# Patient Record
Sex: MALE | Race: OTHER | NOT HISPANIC OR LATINO | URBAN - METROPOLITAN AREA
[De-identification: names, ages, dates, MRNs, and addresses within clinical notes are randomized per-mention and may not be internally consistent; named-entity substitution may affect disease eponyms.]

---

## 2018-04-02 ENCOUNTER — EMERGENCY (EMERGENCY)
Facility: HOSPITAL | Age: 68
LOS: 1 days | Discharge: HOME | End: 2018-04-02
Attending: EMERGENCY MEDICINE
Payer: COMMERCIAL

## 2018-04-02 VITALS
TEMPERATURE: 97 F | HEART RATE: 79 BPM | SYSTOLIC BLOOD PRESSURE: 136 MMHG | RESPIRATION RATE: 18 BRPM | OXYGEN SATURATION: 100 % | DIASTOLIC BLOOD PRESSURE: 76 MMHG

## 2018-04-02 VITALS
HEART RATE: 67 BPM | OXYGEN SATURATION: 100 % | RESPIRATION RATE: 18 BRPM | DIASTOLIC BLOOD PRESSURE: 66 MMHG | TEMPERATURE: 97 F | SYSTOLIC BLOOD PRESSURE: 122 MMHG

## 2018-04-02 DIAGNOSIS — Z95.1 PRESENCE OF AORTOCORONARY BYPASS GRAFT: Chronic | ICD-10-CM

## 2018-04-02 LAB
ALBUMIN SERPL ELPH-MCNC: 3.3 G/DL — LOW (ref 3.5–5)
ALP SERPL-CCNC: 64 U/L — SIGNIFICANT CHANGE UP (ref 40–120)
ALT FLD-CCNC: 109 U/L DA — HIGH (ref 10–60)
ANION GAP SERPL CALC-SCNC: 9 MMOL/L — SIGNIFICANT CHANGE UP (ref 5–17)
APPEARANCE UR: CLEAR — SIGNIFICANT CHANGE UP
AST SERPL-CCNC: 96 U/L — HIGH (ref 10–40)
BASOPHILS # BLD AUTO: 0.1 K/UL — SIGNIFICANT CHANGE UP (ref 0–0.2)
BASOPHILS NFR BLD AUTO: 1 % — SIGNIFICANT CHANGE UP (ref 0–2)
BILIRUB SERPL-MCNC: 0.5 MG/DL — SIGNIFICANT CHANGE UP (ref 0.2–1.2)
BILIRUB UR-MCNC: NEGATIVE — SIGNIFICANT CHANGE UP
BUN SERPL-MCNC: 13 MG/DL — SIGNIFICANT CHANGE UP (ref 7–18)
CALCIUM SERPL-MCNC: 8.2 MG/DL — LOW (ref 8.4–10.5)
CHLORIDE SERPL-SCNC: 104 MMOL/L — SIGNIFICANT CHANGE UP (ref 96–108)
CO2 SERPL-SCNC: 22 MMOL/L — SIGNIFICANT CHANGE UP (ref 22–31)
COLOR SPEC: YELLOW — SIGNIFICANT CHANGE UP
CREAT SERPL-MCNC: 0.91 MG/DL — SIGNIFICANT CHANGE UP (ref 0.5–1.3)
DIFF PNL FLD: NEGATIVE — SIGNIFICANT CHANGE UP
EOSINOPHIL # BLD AUTO: 0.5 K/UL — SIGNIFICANT CHANGE UP (ref 0–0.5)
EOSINOPHIL NFR BLD AUTO: 6 % — SIGNIFICANT CHANGE UP (ref 0–6)
GLUCOSE SERPL-MCNC: 143 MG/DL — HIGH (ref 70–99)
GLUCOSE UR QL: NEGATIVE — SIGNIFICANT CHANGE UP
HCT VFR BLD CALC: 41.6 % — SIGNIFICANT CHANGE UP (ref 39–50)
HGB BLD-MCNC: 13.4 G/DL — SIGNIFICANT CHANGE UP (ref 13–17)
KETONES UR-MCNC: NEGATIVE — SIGNIFICANT CHANGE UP
LEUKOCYTE ESTERASE UR-ACNC: ABNORMAL
LYMPHOCYTES # BLD AUTO: 1.9 K/UL — SIGNIFICANT CHANGE UP (ref 1–3.3)
LYMPHOCYTES # BLD AUTO: 20.9 % — SIGNIFICANT CHANGE UP (ref 13–44)
MCHC RBC-ENTMCNC: 25.8 PG — LOW (ref 27–34)
MCHC RBC-ENTMCNC: 32.2 GM/DL — SIGNIFICANT CHANGE UP (ref 32–36)
MCV RBC AUTO: 80 FL — SIGNIFICANT CHANGE UP (ref 80–100)
MONOCYTES # BLD AUTO: 1.1 K/UL — HIGH (ref 0–0.9)
MONOCYTES NFR BLD AUTO: 12.4 % — SIGNIFICANT CHANGE UP (ref 2–14)
NEUTROPHILS # BLD AUTO: 5.3 K/UL — SIGNIFICANT CHANGE UP (ref 1.8–7.4)
NEUTROPHILS NFR BLD AUTO: 59.7 % — SIGNIFICANT CHANGE UP (ref 43–77)
NITRITE UR-MCNC: NEGATIVE — SIGNIFICANT CHANGE UP
PH UR: 6 — SIGNIFICANT CHANGE UP (ref 5–8)
PLATELET # BLD AUTO: 313 K/UL — SIGNIFICANT CHANGE UP (ref 150–400)
POTASSIUM SERPL-MCNC: 2.8 MMOL/L — CRITICAL LOW (ref 3.5–5.3)
POTASSIUM SERPL-SCNC: 2.8 MMOL/L — CRITICAL LOW (ref 3.5–5.3)
PROT SERPL-MCNC: 7.1 G/DL — SIGNIFICANT CHANGE UP (ref 6–8.3)
PROT UR-MCNC: 15
RBC # BLD: 5.21 M/UL — SIGNIFICANT CHANGE UP (ref 4.2–5.8)
RBC # FLD: 10.8 % — SIGNIFICANT CHANGE UP (ref 10.3–14.5)
SODIUM SERPL-SCNC: 135 MMOL/L — SIGNIFICANT CHANGE UP (ref 135–145)
SP GR SPEC: 1.01 — SIGNIFICANT CHANGE UP (ref 1.01–1.02)
UROBILINOGEN FLD QL: NEGATIVE — SIGNIFICANT CHANGE UP
WBC # BLD: 8.9 K/UL — SIGNIFICANT CHANGE UP (ref 3.8–10.5)
WBC # FLD AUTO: 8.9 K/UL — SIGNIFICANT CHANGE UP (ref 3.8–10.5)

## 2018-04-02 PROCEDURE — 99284 EMERGENCY DEPT VISIT MOD MDM: CPT

## 2018-04-02 PROCEDURE — 81001 URINALYSIS AUTO W/SCOPE: CPT

## 2018-04-02 PROCEDURE — 99284 EMERGENCY DEPT VISIT MOD MDM: CPT | Mod: 25

## 2018-04-02 PROCEDURE — 87045 FECES CULTURE AEROBIC BACT: CPT

## 2018-04-02 PROCEDURE — 93005 ELECTROCARDIOGRAM TRACING: CPT

## 2018-04-02 PROCEDURE — 96376 TX/PRO/DX INJ SAME DRUG ADON: CPT

## 2018-04-02 PROCEDURE — 85027 COMPLETE CBC AUTOMATED: CPT

## 2018-04-02 PROCEDURE — 87177 OVA AND PARASITES SMEARS: CPT

## 2018-04-02 PROCEDURE — 80053 COMPREHEN METABOLIC PANEL: CPT

## 2018-04-02 PROCEDURE — 96374 THER/PROPH/DIAG INJ IV PUSH: CPT

## 2018-04-02 PROCEDURE — 87046 STOOL CULTR AEROBIC BACT EA: CPT

## 2018-04-02 RX ORDER — POTASSIUM CHLORIDE 20 MEQ
10 PACKET (EA) ORAL
Qty: 0 | Refills: 0 | Status: COMPLETED | OUTPATIENT
Start: 2018-04-02 | End: 2018-04-02

## 2018-04-02 RX ORDER — MOXIFLOXACIN HYDROCHLORIDE TABLETS, 400 MG 400 MG/1
1 TABLET, FILM COATED ORAL
Qty: 10 | Refills: 0 | OUTPATIENT
Start: 2018-04-02 | End: 2018-04-06

## 2018-04-02 RX ORDER — SODIUM CHLORIDE 9 MG/ML
1000 INJECTION INTRAMUSCULAR; INTRAVENOUS; SUBCUTANEOUS ONCE
Qty: 0 | Refills: 0 | Status: COMPLETED | OUTPATIENT
Start: 2018-04-02 | End: 2018-04-02

## 2018-04-02 RX ORDER — CIPROFLOXACIN LACTATE 400MG/40ML
500 VIAL (ML) INTRAVENOUS ONCE
Qty: 0 | Refills: 0 | Status: COMPLETED | OUTPATIENT
Start: 2018-04-02 | End: 2018-04-02

## 2018-04-02 RX ORDER — POTASSIUM CHLORIDE 20 MEQ
40 PACKET (EA) ORAL ONCE
Qty: 0 | Refills: 0 | Status: COMPLETED | OUTPATIENT
Start: 2018-04-02 | End: 2018-04-02

## 2018-04-02 RX ADMIN — Medication 100 MILLIEQUIVALENT(S): at 17:18

## 2018-04-02 RX ADMIN — Medication 40 MILLIEQUIVALENT(S): at 17:17

## 2018-04-02 RX ADMIN — Medication 100 MILLIEQUIVALENT(S): at 19:56

## 2018-04-02 RX ADMIN — Medication 100 MILLIEQUIVALENT(S): at 18:39

## 2018-04-02 RX ADMIN — SODIUM CHLORIDE 1000 MILLILITER(S): 9 INJECTION INTRAMUSCULAR; INTRAVENOUS; SUBCUTANEOUS at 16:40

## 2018-04-02 RX ADMIN — Medication 500 MILLIGRAM(S): at 17:18

## 2018-04-02 NOTE — ED PROVIDER NOTE - PROGRESS NOTE DETAILS
k+ = 2.8, 40meq po and 10meq iv x 3. abd remains soft, non-tender. EKG reviewed. pt moved to main for electrolyte replacement. explained to patient. will endorse to Dr. Garcia. Pt feels better, asking to go home. Tolerated PO, no BM in ED. Educated on care and f/u PMD. Answered q's.

## 2018-04-02 NOTE — ED PROVIDER NOTE - ATTENDING CONTRIBUTION TO CARE
h/o CAD  1 week nonbloody diarrhea, subjective fever  onset while visiting [place]  no abd pain or vomiting  oral mucosa dry, abd soft NT  labs-hypokalemia  IV potassium, supplement with oral  pt feels better agrees to outpatient management  tolerated po  cipro bid x 5 days, f/u PMD

## 2018-04-02 NOTE — ED PROVIDER NOTE - SKIN, MLM
Skin is dry, warm, dry and intact. No evidence of rash. Skin is PALE, dry, warm, dry and intact. No evidence of rash.

## 2018-04-02 NOTE — ED PROVIDER NOTE - OBJECTIVE STATEMENT
68 y/o M pt w/ PMHx of HTN, DM cardiac stents presents c/o diarrhea x 5 days. Pt just returned from [place] and [place]. Pt is on plavix, aspirin, Metroprolol, lisinopril, Atorvastatin clopidogrel, isosorbide mononitrate. Denies any other complaints. NKDA. 66 y/o M pt w/ PMHx of HTN, DM cardiac stents presents c/o diarrhea x 5 days. no nausea/vomiting/abd pain. denies fevers. Pt just returned from [place] and [place]. Pt is on plavix, aspirin, Metroprolol, lisinopril, Atorvastatin clopidogrel, isosorbide mononitrate. Denies any other complaints. NKDA.

## 2018-04-02 NOTE — ED PROVIDER NOTE - MEDICAL DECISION MAKING DETAILS
Pt w/ diarrhea. Will do labs, hydrate, stool cultures, consider CT. Reassess. Pt w/ diarrhea. Will do labs, hydrate, stool cultures. Reassess.

## 2018-04-04 LAB
CULTURE RESULTS: SIGNIFICANT CHANGE UP
SPECIMEN SOURCE: SIGNIFICANT CHANGE UP

## 2018-04-05 LAB
CULTURE RESULTS: SIGNIFICANT CHANGE UP
SPECIMEN SOURCE: SIGNIFICANT CHANGE UP

## 2018-07-05 ENCOUNTER — EMERGENCY (EMERGENCY)
Facility: HOSPITAL | Age: 68
LOS: 1 days | Discharge: HOME | End: 2018-07-05
Attending: EMERGENCY MEDICINE
Payer: COMMERCIAL

## 2018-07-05 VITALS — WEIGHT: 167.99 LBS | HEIGHT: 68 IN

## 2018-07-05 VITALS
DIASTOLIC BLOOD PRESSURE: 68 MMHG | OXYGEN SATURATION: 98 % | RESPIRATION RATE: 17 BRPM | SYSTOLIC BLOOD PRESSURE: 121 MMHG | TEMPERATURE: 98 F | HEART RATE: 67 BPM

## 2018-07-05 DIAGNOSIS — Z95.1 PRESENCE OF AORTOCORONARY BYPASS GRAFT: Chronic | ICD-10-CM

## 2018-07-05 PROBLEM — I10 ESSENTIAL (PRIMARY) HYPERTENSION: Chronic | Status: ACTIVE | Noted: 2018-04-02

## 2018-07-05 PROBLEM — I25.10 ATHEROSCLEROTIC HEART DISEASE OF NATIVE CORONARY ARTERY WITHOUT ANGINA PECTORIS: Chronic | Status: ACTIVE | Noted: 2018-04-02

## 2018-07-05 PROCEDURE — 99282 EMERGENCY DEPT VISIT SF MDM: CPT

## 2018-07-05 PROCEDURE — 73090 X-RAY EXAM OF FOREARM: CPT | Mod: 26,LT

## 2018-07-05 PROCEDURE — 73090 X-RAY EXAM OF FOREARM: CPT

## 2018-07-05 PROCEDURE — 73080 X-RAY EXAM OF ELBOW: CPT | Mod: 26,LT

## 2018-07-05 PROCEDURE — 93971 EXTREMITY STUDY: CPT | Mod: 26,LT

## 2018-07-05 PROCEDURE — 93971 EXTREMITY STUDY: CPT

## 2018-07-05 PROCEDURE — 99284 EMERGENCY DEPT VISIT MOD MDM: CPT | Mod: 25

## 2018-07-05 PROCEDURE — 73080 X-RAY EXAM OF ELBOW: CPT

## 2018-07-05 RX ORDER — IBUPROFEN 200 MG
600 TABLET ORAL ONCE
Qty: 0 | Refills: 0 | Status: COMPLETED | OUTPATIENT
Start: 2018-07-05 | End: 2018-07-05

## 2018-07-05 RX ORDER — DICLOFENAC SODIUM 30 MG/G
2 GEL TOPICAL
Qty: 60 | Refills: 0 | OUTPATIENT
Start: 2018-07-05 | End: 2018-07-11

## 2018-07-05 RX ORDER — METHOCARBAMOL 500 MG/1
750 TABLET, FILM COATED ORAL ONCE
Qty: 0 | Refills: 0 | Status: DISCONTINUED | OUTPATIENT
Start: 2018-07-05 | End: 2018-07-05

## 2018-07-05 RX ADMIN — Medication 600 MILLIGRAM(S): at 17:04

## 2018-07-05 RX ADMIN — Medication 600 MILLIGRAM(S): at 13:50

## 2018-07-05 NOTE — ED PROVIDER NOTE - ATTENDING CONTRIBUTION TO CARE
left forearm swelling after forced hyperextension. on exam mild localized swelling to proximal forearm. 2+ radial andbrachial pulse. good cap refill. duplex xray normal. home with ace wrap.

## 2018-07-05 NOTE — ED PROVIDER NOTE - PROGRESS NOTE DETAILS
Xrays and US negative. History and findings suggestive of muscle strain vs possible tear. Will dc with ortho follow up. Will give Voltaren gel Rx and ACE wrap. Pt is well appearing walking with steady gait, stable for discharge and follow up without fail with medical doctor. I had a detailed discussion with the patient and/or guardian regarding the historical points, exam findings, and any diagnostic results supporting the discharge diagnosis. Pt educated on care and need for follow up. Strict return instructions and red flag signs and symptoms discussed with patient. Questions answered. Pt shows understanding of discharge information and agrees to follow.
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
## 2018-07-05 NOTE — ED PROVIDER NOTE - PHYSICAL EXAMINATION
L arm exam: L elbow full range of motion without bony tenderness. Proximal forearm swelling without erythema, streaking, induration, drainage of increased heat. Mild localized tenderness. Radial/ulnar pulses intact 2+.

## 2019-10-30 ENCOUNTER — APPOINTMENT (OUTPATIENT)
Dept: CARDIOLOGY | Facility: CLINIC | Age: 69
End: 2019-10-30

## 2019-10-31 ENCOUNTER — APPOINTMENT (OUTPATIENT)
Dept: CARDIOLOGY | Facility: CLINIC | Age: 69
End: 2019-10-31
Payer: MEDICARE

## 2019-10-31 ENCOUNTER — NON-APPOINTMENT (OUTPATIENT)
Age: 69
End: 2019-10-31

## 2019-10-31 VITALS
BODY MASS INDEX: 24.25 KG/M2 | OXYGEN SATURATION: 98 % | SYSTOLIC BLOOD PRESSURE: 147 MMHG | HEIGHT: 68 IN | DIASTOLIC BLOOD PRESSURE: 75 MMHG | HEART RATE: 61 BPM | WEIGHT: 160 LBS

## 2019-10-31 PROCEDURE — 93000 ELECTROCARDIOGRAM COMPLETE: CPT

## 2019-10-31 PROCEDURE — 99204 OFFICE O/P NEW MOD 45 MIN: CPT

## 2019-10-31 NOTE — DISCUSSION/SUMMARY
[FreeTextEntry1] : Patient with a complex cardiac history with CABG followed by multiple stents.  No follow up in several years.  Nuclear stress ordered to evaluate extent of ischemic disease.

## 2019-10-31 NOTE — HISTORY OF PRESENT ILLNESS
[FreeTextEntry1] : Patient s/p CABG in 2004.  After CABG he needed emergent coronary stent after an MI.  He is currently free of chest pain or exertional dyspnea.  He remains very active.

## 2019-10-31 NOTE — PHYSICAL EXAM
[General Appearance - Well Developed] : well developed [Normal Appearance] : normal appearance [Well Groomed] : well groomed [General Appearance - Well Nourished] : well nourished [No Deformities] : no deformities [General Appearance - In No Acute Distress] : no acute distress [Normal Conjunctiva] : the conjunctiva exhibited no abnormalities [Eyelids - No Xanthelasma] : the eyelids demonstrated no xanthelasmas [Normal Oral Mucosa] : normal oral mucosa [No Oral Pallor] : no oral pallor [No Oral Cyanosis] : no oral cyanosis [Normal Jugular Venous A Waves Present] : normal jugular venous A waves present [Normal Jugular Venous V Waves Present] : normal jugular venous V waves present [No Jugular Venous Triana A Waves] : no jugular venous triana A waves [Heart Rate And Rhythm] : heart rate and rhythm were normal [Heart Sounds] : normal S1 and S2 [Murmurs] : no murmurs present [Respiration, Rhythm And Depth] : normal respiratory rhythm and effort [Exaggerated Use Of Accessory Muscles For Inspiration] : no accessory muscle use [Auscultation Breath Sounds / Voice Sounds] : lungs were clear to auscultation bilaterally [Abdomen Soft] : soft [Abdomen Tenderness] : non-tender [Abdomen Mass (___ Cm)] : no abdominal mass palpated [Abnormal Walk] : normal gait [Gait - Sufficient For Exercise Testing] : the gait was sufficient for exercise testing [Nail Clubbing] : no clubbing of the fingernails [Cyanosis, Localized] : no localized cyanosis [Petechial Hemorrhages (___cm)] : no petechial hemorrhages [Skin Color & Pigmentation] : normal skin color and pigmentation [] : no rash [No Venous Stasis] : no venous stasis [Skin Lesions] : no skin lesions [No Skin Ulcers] : no skin ulcer [No Xanthoma] : no  xanthoma was observed [Oriented To Time, Place, And Person] : oriented to person, place, and time [Affect] : the affect was normal [Mood] : the mood was normal [No Anxiety] : not feeling anxious

## 2019-10-31 NOTE — REASON FOR VISIT
[Consultation] : a consultation regarding [Coronary Artery Disease] : coronary artery disease [Hyperlipidemia] : hyperlipidemia

## 2019-11-07 ENCOUNTER — APPOINTMENT (OUTPATIENT)
Dept: CV DIAGNOSTICS | Facility: HOSPITAL | Age: 69
End: 2019-11-07

## 2019-11-07 ENCOUNTER — OUTPATIENT (OUTPATIENT)
Dept: OUTPATIENT SERVICES | Facility: HOSPITAL | Age: 69
LOS: 1 days | End: 2019-11-07
Payer: COMMERCIAL

## 2019-11-07 DIAGNOSIS — Z95.1 PRESENCE OF AORTOCORONARY BYPASS GRAFT: Chronic | ICD-10-CM

## 2019-11-07 DIAGNOSIS — I25.10 ATHEROSCLEROTIC HEART DISEASE OF NATIVE CORONARY ARTERY WITHOUT ANGINA PECTORIS: ICD-10-CM

## 2019-11-07 PROCEDURE — 93018 CV STRESS TEST I&R ONLY: CPT

## 2019-11-07 PROCEDURE — A9500: CPT

## 2019-11-07 PROCEDURE — 78452 HT MUSCLE IMAGE SPECT MULT: CPT

## 2019-11-07 PROCEDURE — 78452 HT MUSCLE IMAGE SPECT MULT: CPT | Mod: 26

## 2019-11-07 PROCEDURE — 93017 CV STRESS TEST TRACING ONLY: CPT

## 2019-11-07 PROCEDURE — 93016 CV STRESS TEST SUPVJ ONLY: CPT

## 2019-11-14 LAB
CHOLEST SERPL-MCNC: 121 MG/DL
CHOLEST/HDLC SERPL: 2.8 RATIO
HDLC SERPL-MCNC: 43 MG/DL
LDLC SERPL CALC-MCNC: 57 MG/DL
TRIGL SERPL-MCNC: 107 MG/DL

## 2019-11-18 RX ORDER — ASPIRIN ENTERIC COATED TABLETS 81 MG 81 MG/1
81 TABLET, DELAYED RELEASE ORAL DAILY
Refills: 3 | Status: ACTIVE | COMMUNITY

## 2019-11-18 RX ORDER — MULTIVIT-MIN/FA/LYCOPEN/LUTEIN .4-300-25
TABLET ORAL
Refills: 0 | Status: ACTIVE | COMMUNITY

## 2019-12-06 ENCOUNTER — MEDICATION RENEWAL (OUTPATIENT)
Age: 69
End: 2019-12-06

## 2020-01-13 ENCOUNTER — TRANSCRIPTION ENCOUNTER (OUTPATIENT)
Age: 70
End: 2020-01-13

## 2020-02-27 ENCOUNTER — NON-APPOINTMENT (OUTPATIENT)
Age: 70
End: 2020-02-27

## 2020-02-27 ENCOUNTER — APPOINTMENT (OUTPATIENT)
Dept: CARDIOLOGY | Facility: CLINIC | Age: 70
End: 2020-02-27
Payer: MEDICARE

## 2020-02-27 VITALS
DIASTOLIC BLOOD PRESSURE: 77 MMHG | WEIGHT: 168 LBS | SYSTOLIC BLOOD PRESSURE: 137 MMHG | BODY MASS INDEX: 25.54 KG/M2 | HEART RATE: 64 BPM | OXYGEN SATURATION: 98 %

## 2020-02-27 PROCEDURE — 93000 ELECTROCARDIOGRAM COMPLETE: CPT

## 2020-02-27 PROCEDURE — 99214 OFFICE O/P EST MOD 30 MIN: CPT

## 2020-02-27 NOTE — HISTORY OF PRESENT ILLNESS
[FreeTextEntry1] : Patient with CAD s/p CABG in 2004.  He is active and without any symptoms of ischemia.  Nuclear showed kenna of infarct with post infarct ischemia.  LDL 57

## 2020-02-27 NOTE — PHYSICAL EXAM
[General Appearance - Well Developed] : well developed [Normal Appearance] : normal appearance [Well Groomed] : well groomed [No Deformities] : no deformities [General Appearance - Well Nourished] : well nourished [Normal Conjunctiva] : the conjunctiva exhibited no abnormalities [General Appearance - In No Acute Distress] : no acute distress [Normal Oral Mucosa] : normal oral mucosa [Eyelids - No Xanthelasma] : the eyelids demonstrated no xanthelasmas [No Oral Pallor] : no oral pallor [No Oral Cyanosis] : no oral cyanosis [Normal Jugular Venous A Waves Present] : normal jugular venous A waves present [No Jugular Venous Triana A Waves] : no jugular venous triana A waves [Normal Jugular Venous V Waves Present] : normal jugular venous V waves present [Exaggerated Use Of Accessory Muscles For Inspiration] : no accessory muscle use [Respiration, Rhythm And Depth] : normal respiratory rhythm and effort [Auscultation Breath Sounds / Voice Sounds] : lungs were clear to auscultation bilaterally [Heart Rate And Rhythm] : heart rate and rhythm were normal [Murmurs] : no murmurs present [Heart Sounds] : normal S1 and S2 [Abdomen Soft] : soft [Abdomen Tenderness] : non-tender [Abdomen Mass (___ Cm)] : no abdominal mass palpated [Abnormal Walk] : normal gait [Gait - Sufficient For Exercise Testing] : the gait was sufficient for exercise testing [Nail Clubbing] : no clubbing of the fingernails [Cyanosis, Localized] : no localized cyanosis [Petechial Hemorrhages (___cm)] : no petechial hemorrhages [] : no rash [Skin Color & Pigmentation] : normal skin color and pigmentation [No Venous Stasis] : no venous stasis [Skin Lesions] : no skin lesions [No Skin Ulcers] : no skin ulcer [Oriented To Time, Place, And Person] : oriented to person, place, and time [No Xanthoma] : no  xanthoma was observed [Mood] : the mood was normal [No Anxiety] : not feeling anxious [Affect] : the affect was normal

## 2020-08-27 ENCOUNTER — APPOINTMENT (OUTPATIENT)
Dept: CARDIOLOGY | Facility: CLINIC | Age: 70
End: 2020-08-27

## 2020-11-23 ENCOUNTER — APPOINTMENT (OUTPATIENT)
Dept: CARDIOLOGY | Facility: CLINIC | Age: 70
End: 2020-11-23

## 2021-02-11 ENCOUNTER — RX RENEWAL (OUTPATIENT)
Age: 71
End: 2021-02-11

## 2021-03-25 ENCOUNTER — APPOINTMENT (OUTPATIENT)
Dept: CARDIOLOGY | Facility: CLINIC | Age: 71
End: 2021-03-25

## 2021-04-16 ENCOUNTER — RX RENEWAL (OUTPATIENT)
Age: 71
End: 2021-04-16

## 2022-01-21 ENCOUNTER — EMERGENCY (EMERGENCY)
Facility: HOSPITAL | Age: 72
LOS: 1 days | Discharge: HOME | End: 2022-01-21
Attending: EMERGENCY MEDICINE | Admitting: EMERGENCY MEDICINE
Payer: MEDICARE

## 2022-01-21 VITALS
SYSTOLIC BLOOD PRESSURE: 152 MMHG | OXYGEN SATURATION: 100 % | TEMPERATURE: 97 F | HEART RATE: 60 BPM | RESPIRATION RATE: 16 BRPM | DIASTOLIC BLOOD PRESSURE: 74 MMHG | HEIGHT: 68 IN

## 2022-01-21 VITALS
OXYGEN SATURATION: 100 % | DIASTOLIC BLOOD PRESSURE: 75 MMHG | RESPIRATION RATE: 16 BRPM | SYSTOLIC BLOOD PRESSURE: 146 MMHG | HEART RATE: 60 BPM | TEMPERATURE: 98 F

## 2022-01-21 DIAGNOSIS — Z95.1 PRESENCE OF AORTOCORONARY BYPASS GRAFT: Chronic | ICD-10-CM

## 2022-01-21 LAB
ALBUMIN SERPL ELPH-MCNC: 4.7 G/DL — SIGNIFICANT CHANGE UP (ref 3.3–5)
ALP SERPL-CCNC: 58 U/L — SIGNIFICANT CHANGE UP (ref 40–120)
ALT FLD-CCNC: 33 U/L — SIGNIFICANT CHANGE UP (ref 4–41)
ANION GAP SERPL CALC-SCNC: 10 MMOL/L — SIGNIFICANT CHANGE UP (ref 7–14)
AST SERPL-CCNC: 24 U/L — SIGNIFICANT CHANGE UP (ref 4–40)
BASOPHILS # BLD AUTO: 0.05 K/UL — SIGNIFICANT CHANGE UP (ref 0–0.2)
BASOPHILS NFR BLD AUTO: 0.5 % — SIGNIFICANT CHANGE UP (ref 0–2)
BILIRUB SERPL-MCNC: 0.4 MG/DL — SIGNIFICANT CHANGE UP (ref 0.2–1.2)
BUN SERPL-MCNC: 15 MG/DL — SIGNIFICANT CHANGE UP (ref 7–23)
CALCIUM SERPL-MCNC: 9.6 MG/DL — SIGNIFICANT CHANGE UP (ref 8.4–10.5)
CHLORIDE SERPL-SCNC: 103 MMOL/L — SIGNIFICANT CHANGE UP (ref 98–107)
CO2 SERPL-SCNC: 25 MMOL/L — SIGNIFICANT CHANGE UP (ref 22–31)
CREAT SERPL-MCNC: 0.78 MG/DL — SIGNIFICANT CHANGE UP (ref 0.5–1.3)
EOSINOPHIL # BLD AUTO: 1.18 K/UL — HIGH (ref 0–0.5)
EOSINOPHIL NFR BLD AUTO: 12.8 % — HIGH (ref 0–6)
GLUCOSE SERPL-MCNC: 142 MG/DL — HIGH (ref 70–99)
HCT VFR BLD CALC: 42.8 % — SIGNIFICANT CHANGE UP (ref 39–50)
HGB BLD-MCNC: 13.9 G/DL — SIGNIFICANT CHANGE UP (ref 13–17)
IANC: 5.66 K/UL — SIGNIFICANT CHANGE UP (ref 1.5–8.5)
IMM GRANULOCYTES NFR BLD AUTO: 0.3 % — SIGNIFICANT CHANGE UP (ref 0–1.5)
LYMPHOCYTES # BLD AUTO: 1.55 K/UL — SIGNIFICANT CHANGE UP (ref 1–3.3)
LYMPHOCYTES # BLD AUTO: 16.8 % — SIGNIFICANT CHANGE UP (ref 13–44)
MCHC RBC-ENTMCNC: 25.9 PG — LOW (ref 27–34)
MCHC RBC-ENTMCNC: 32.5 GM/DL — SIGNIFICANT CHANGE UP (ref 32–36)
MCV RBC AUTO: 79.9 FL — LOW (ref 80–100)
MONOCYTES # BLD AUTO: 0.76 K/UL — SIGNIFICANT CHANGE UP (ref 0–0.9)
MONOCYTES NFR BLD AUTO: 8.2 % — SIGNIFICANT CHANGE UP (ref 2–14)
NEUTROPHILS # BLD AUTO: 5.66 K/UL — SIGNIFICANT CHANGE UP (ref 1.8–7.4)
NEUTROPHILS NFR BLD AUTO: 61.4 % — SIGNIFICANT CHANGE UP (ref 43–77)
NRBC # BLD: 0 /100 WBCS — SIGNIFICANT CHANGE UP
NRBC # FLD: 0 K/UL — SIGNIFICANT CHANGE UP
NT-PROBNP SERPL-SCNC: 75 PG/ML — SIGNIFICANT CHANGE UP
PLATELET # BLD AUTO: 257 K/UL — SIGNIFICANT CHANGE UP (ref 150–400)
POTASSIUM SERPL-MCNC: 4.6 MMOL/L — SIGNIFICANT CHANGE UP (ref 3.5–5.3)
POTASSIUM SERPL-SCNC: 4.6 MMOL/L — SIGNIFICANT CHANGE UP (ref 3.5–5.3)
PROT SERPL-MCNC: 8 G/DL — SIGNIFICANT CHANGE UP (ref 6–8.3)
RBC # BLD: 5.36 M/UL — SIGNIFICANT CHANGE UP (ref 4.2–5.8)
RBC # FLD: 13.6 % — SIGNIFICANT CHANGE UP (ref 10.3–14.5)
SODIUM SERPL-SCNC: 138 MMOL/L — SIGNIFICANT CHANGE UP (ref 135–145)
WBC # BLD: 9.23 K/UL — SIGNIFICANT CHANGE UP (ref 3.8–10.5)
WBC # FLD AUTO: 9.23 K/UL — SIGNIFICANT CHANGE UP (ref 3.8–10.5)

## 2022-01-21 PROCEDURE — 99284 EMERGENCY DEPT VISIT MOD MDM: CPT

## 2022-01-21 RX ORDER — ACETAMINOPHEN 500 MG
650 TABLET ORAL ONCE
Refills: 0 | Status: COMPLETED | OUTPATIENT
Start: 2022-01-21 | End: 2022-01-21

## 2022-01-21 RX ORDER — KETOROLAC TROMETHAMINE 30 MG/ML
15 SYRINGE (ML) INJECTION ONCE
Refills: 0 | Status: DISCONTINUED | OUTPATIENT
Start: 2022-01-21 | End: 2022-01-21

## 2022-01-21 RX ORDER — LIDOCAINE 4 G/100G
1 CREAM TOPICAL ONCE
Refills: 0 | Status: COMPLETED | OUTPATIENT
Start: 2022-01-21 | End: 2022-01-21

## 2022-01-21 RX ADMIN — LIDOCAINE 1 PATCH: 4 CREAM TOPICAL at 11:41

## 2022-01-21 RX ADMIN — Medication 650 MILLIGRAM(S): at 13:50

## 2022-01-21 RX ADMIN — Medication 650 MILLIGRAM(S): at 11:40

## 2022-01-21 RX ADMIN — Medication 15 MILLIGRAM(S): at 13:50

## 2022-01-21 RX ADMIN — Medication 15 MILLIGRAM(S): at 11:40

## 2022-01-21 NOTE — ED ADULT NURSE REASSESSMENT NOTE - NS ED NURSE REASSESS COMMENT FT1
pt remains alert,oriented x3. states decreased pain to back. lunch given at present. awaits continued eval,labs. will continue to monitor

## 2022-01-21 NOTE — ED PROVIDER NOTE - PATIENT PORTAL LINK FT
You can access the FollowMyHealth Patient Portal offered by [facility] by registering at the following website: [email or web address]. By joining [employer]’s FollowMyHealth portal, you will also be able to view your health information using other applications (apps) compatible with our system.

## 2022-01-21 NOTE — ED PROVIDER NOTE - NSFOLLOWUPINSTRUCTIONS_ED_ALL_ED_FT
[facility]  Orthopedic Surgery  [address] [place] [address]  Phone: [phone]  Fax:   Follow Up Time:     [facility]  Orthopedic Surgery  [address], 3rd & 4th [place] [address]  Phone: [phone]  Fax:   Follow Up Time:     [facility] [place]  Orthopedics  .  [place]   Phone: [phone]  Fax:   Follow Up Time:     [facility]  Orthopedics  [address] [place] [address]  Phone: [phone]  Fax: [phone]  Follow Up Time:     [facility]  Orthopedics  [address] [place] [address]  Phone: [phone]  Fax:   Follow Up Time:     Back Pain  WHAT YOU NEED TO KNOW:  Back pain is common. It can be caused by many conditions, such as arthritis or the breakdown of spinal discs. Your risk for back pain is increased by injuries, lack of activity, or repeated bending and twisting. You may feel sore or stiff on one or both sides of your back. The pain may spread to your buttocks or thighs.  DISCHARGE INSTRUCTIONS:  Return to the emergency department if:   •You have pain, numbness, or weakness in one or both legs.  •Your pain becomes so severe that you cannot walk.  •You cannot control your urine or bowel movements.  •You have severe back pain with chest pain.  •You have severe back pain, nausea, and vomiting.  •You have severe back pain that spreads to your side or genital area.  Contact your healthcare provider if:   •You have back pain that does not get better with rest and pain medicine.  •You have a fever.  •You have pain that worsens when you are on your back or when you rest.  •You have pain that worsens when you cough or sneeze.  •You lose weight without trying.  •You have questions or concerns about your condition or care.  Medicines:   •NSAIDs help decrease swelling and pain. This medicine is available with or without a doctor's order. NSAIDs can cause stomach bleeding or kidney problems in certain people. If you take blood thinner medicine, always ask your healthcare provider if NSAIDs are safe for you. Always read the medicine label and follow directions.  •Acetaminophen decreases pain and fever. It is available without a doctor's order. Ask how much to take and how often to take it. Follow directions. Read the labels of all other medicines you are using to see if they also contain acetaminophen, or ask your doctor or pharmacist. Acetaminophen can cause liver damage if not taken correctly. Do not use more than 4 grams (4,000 milligrams) total of acetaminophen in one day.   •Muscle relaxers help decrease muscle spasms and back pain.  •Prescription pain medicine may be given. Ask your healthcare provider how to take this medicine safely. Some prescription pain medicines contain acetaminophen. Do not take other medicines that contain acetaminophen without talking to your healthcare provider. Too much acetaminophen may cause liver damage. Prescription pain medicine may cause constipation. Ask your healthcare provider how to prevent or treat constipation.   •Take your medicine as directed. Contact your healthcare provider if you think your medicine is not helping or if you have side effects. Tell him or her if you are allergic to any medicine. Keep a list of the medicines, vitamins, and herbs you take. Include the amounts, and when and why you take them. Bring the list or the pill bottles to follow-up visits. Carry your medicine list with you in case of an emergency.  How to manage your back pain:   •Apply ice on your back for 15 to 20 minutes every hour or as directed. Use an ice pack, or put crushed ice in a plastic bag. Cover it with a towel before you apply it to your skin. Ice helps prevent tissue damage and decreases pain.  •Apply heat on your back for 20 to 30 minutes every 2 hours for as many days as directed. Heat helps decrease pain and muscle spasms.  •Stay active as much as you can without causing more pain. Bed rest could make your back pain worse. Avoid heavy lifting until your pain is gone.  •Go to physical therapy as directed. A physical therapist can teach you exercises to help improve movement and strength, and to decrease pain.  Follow up with your healthcare provider in 2 weeks, or as directed: Write down your questions so you remember to ask them during your visits.

## 2022-01-21 NOTE — ED ADULT NURSE REASSESSMENT NOTE - NS ED NURSE REASSESS COMMENT FT1
pt remains alert,oriented x3. denies c/o pain iv removed by pa,instructions given by pa. pt ambulates to d/c area with rn assistance for son

## 2022-01-21 NOTE — ED PROVIDER NOTE - OBJECTIVE STATEMENT
71y male with PMHx of MI, CAD (3 stents), DM, HTN presents with lower back pain, bilateral knee pain, and bilateral edema of the legs/feet for the last two weeks. Patient states the lower back pain is a 7/10 pain that occurs with sitting/standing up. The back pain radiates out to both sides. He notes bilateral edema in his feet, but this morning the swelling resolved. Patient states his bilateral knee pain is chronic and he previously has received steroid shots with relief. He has taken Tylenol with minimal relief of symptoms. Patient went to Urgent care this morning for the back pain. They sent patient to ER because his EKG showed inferior infarct, which looks similar to past EKGs. Patient denies SOB, chest pain, previous edema, calf pain, fever, N/V trauma, numbness/ tingling, weakness in legs, urinary retention/incontinence, saddle anesthesia.

## 2022-01-21 NOTE — ED PROVIDER NOTE - ATTENDING CONTRIBUTION TO CARE
Seen and examined, states 2wks of gradually worsening LBP bilat, denies inciting event or fall, typically walks daily but has dec. walking recently with inc. pain. Also noted inc. swelling to ankles/feet in past week which was improving for past 2d, today states legs seem normal and not swollen. Denies CP/SOB/orthopnea, no palpitations/lightheadedness, no change in urination. Pt. used Tylenol 650mg x 3doses over past 10d, and has some relief. No meds PTA. Went to urgentcare and sent to ED for eval. due to leg edema. MMM, clear lungs, heart reg, abd soft, NT to palp, no CVAT, scant edema, pitting evident with strong and sustained pressure to ant. ankle, equal pulses, equal girth, NT calves.

## 2022-01-21 NOTE — ED PROVIDER NOTE - CLINICAL SUMMARY MEDICAL DECISION MAKING FREE TEXT BOX
71y male with PMHx of MI, CAD (3 stents), DM, HTN presents with lower back pain and bilateral edema of the legs/ feet for the last two weeks. Patient went to Urgent care where his EKG showed possible inferior infarct and sent him to the ER. EKG findings are most likely old changes. Patient's back pain is likely musculoskeletal. Will order follow up outpatient Ortho, CBC, CMP, Pro BNP, pain relief.

## 2022-01-21 NOTE — ED ADULT NURSE NOTE - OBJECTIVE STATEMENT
pt alert,oriented x3 reports pain to knees ,back since receiving covid  booster. [facility]. . md at bedside. placed c monitot . denies ch pains,diff breathing. states ankles swollen earlier. no swelling noted  at present

## 2022-01-21 NOTE — ED ADULT TRIAGE NOTE - CHIEF COMPLAINT QUOTE
c/o back/knee pain and BL feet swelling for 3x weeks. Denies chest pain, SOB. Sent from urgent care for further workup. Hx CABG on aspirin, HTN, CAD

## 2022-01-21 NOTE — ED PROVIDER NOTE - CARE PLAN
Assessment and plan of treatment:	71y male with PMHx of MI, CAD (3 stents), DM, HTN presents with lower back pain, bilateral knee pain, and bilateral edema of the legs/foot for the last two weeks.   1 Principal Discharge DX:	Back pain  Assessment and plan of treatment:	71y male with PMHx of MI, CAD (3 stents), DM, HTN presents with lower back pain, bilateral knee pain, and bilateral edema of the legs/foot for the last two weeks.

## 2022-01-21 NOTE — ED PROVIDER NOTE - PLAN OF CARE
71y male with PMHx of MI, CAD (3 stents), DM, HTN presents with lower back pain, bilateral knee pain, and bilateral edema of the legs/foot for the last two weeks.

## 2022-01-21 NOTE — ED PROVIDER NOTE - SKIN, MLM
Skin normal color for race, warm, dry and intact. No evidence of rash. +1 pitting edema of the bilateral legs
day(s)
Patient/Caregiver provided printed discharge information.

## 2022-01-26 ENCOUNTER — APPOINTMENT (OUTPATIENT)
Dept: ORTHOPEDIC SURGERY | Facility: CLINIC | Age: 72
End: 2022-01-26
Payer: MEDICARE

## 2022-01-26 ENCOUNTER — NON-APPOINTMENT (OUTPATIENT)
Age: 72
End: 2022-01-26

## 2022-01-26 VITALS
WEIGHT: 167 LBS | OXYGEN SATURATION: 98 % | HEART RATE: 61 BPM | SYSTOLIC BLOOD PRESSURE: 136 MMHG | DIASTOLIC BLOOD PRESSURE: 67 MMHG | HEIGHT: 68 IN | BODY MASS INDEX: 25.31 KG/M2

## 2022-01-26 DIAGNOSIS — M54.16 RADICULOPATHY, LUMBAR REGION: ICD-10-CM

## 2022-01-26 PROCEDURE — 72100 X-RAY EXAM L-S SPINE 2/3 VWS: CPT

## 2022-01-26 PROCEDURE — 99204 OFFICE O/P NEW MOD 45 MIN: CPT

## 2022-03-20 ENCOUNTER — FORM ENCOUNTER (OUTPATIENT)
Age: 72
End: 2022-03-20

## 2023-01-27 NOTE — ED PROVIDER NOTE - CONDUCTED A DETAILED DISCUSSION WITH PATIENT AND/OR GUARDIAN REGARDING, MDM
How Severe Is Your Hair Loss?: mild
need for outpatient follow-up/lab results/return to ED if symptoms worsen, persist or questions arise

## 2023-02-06 NOTE — PHYSICAL EXAM
[General Appearance - Well Developed] : well developed [Normal Appearance] : normal appearance [Well Groomed] : well groomed [General Appearance - Well Nourished] : well nourished [No Deformities] : no deformities [General Appearance - In No Acute Distress] : no acute distress [Normal Conjunctiva] : the conjunctiva exhibited no abnormalities [Eyelids - No Xanthelasma] : the eyelids demonstrated no xanthelasmas [Normal Oral Mucosa] : normal oral mucosa [No Oral Pallor] : no oral pallor [No Oral Cyanosis] : no oral cyanosis [Normal Jugular Venous A Waves Present] : normal jugular venous A waves present [Normal Jugular Venous V Waves Present] : normal jugular venous V waves present [No Jugular Venous Triana A Waves] : no jugular venous triana A waves [Respiration, Rhythm And Depth] : normal respiratory rhythm and effort [Exaggerated Use Of Accessory Muscles For Inspiration] : no accessory muscle use [Auscultation Breath Sounds / Voice Sounds] : lungs were clear to auscultation bilaterally [Heart Rate And Rhythm] : heart rate and rhythm were normal [Heart Sounds] : normal S1 and S2 [Murmurs] : no murmurs present [Abdomen Soft] : soft [Abdomen Tenderness] : non-tender [Abdomen Mass (___ Cm)] : no abdominal mass palpated [Abnormal Walk] : normal gait [Gait - Sufficient For Exercise Testing] : the gait was sufficient for exercise testing [Nail Clubbing] : no clubbing of the fingernails [Cyanosis, Localized] : no localized cyanosis [Petechial Hemorrhages (___cm)] : no petechial hemorrhages [Skin Color & Pigmentation] : normal skin color and pigmentation [] : no rash [No Venous Stasis] : no venous stasis [Skin Lesions] : no skin lesions [No Skin Ulcers] : no skin ulcer [No Xanthoma] : no  xanthoma was observed [Oriented To Time, Place, And Person] : oriented to person, place, and time [Affect] : the affect was normal [Mood] : the mood was normal [No Anxiety] : not feeling anxious

## 2023-02-07 ENCOUNTER — NON-APPOINTMENT (OUTPATIENT)
Age: 73
End: 2023-02-07

## 2023-02-07 ENCOUNTER — APPOINTMENT (OUTPATIENT)
Dept: CARDIOLOGY | Facility: CLINIC | Age: 73
End: 2023-02-07
Payer: MEDICARE

## 2023-02-07 VITALS
DIASTOLIC BLOOD PRESSURE: 74 MMHG | SYSTOLIC BLOOD PRESSURE: 140 MMHG | BODY MASS INDEX: 25.76 KG/M2 | RESPIRATION RATE: 17 BRPM | HEART RATE: 58 BPM | OXYGEN SATURATION: 99 % | WEIGHT: 170 LBS | HEIGHT: 68 IN

## 2023-02-07 VITALS — DIASTOLIC BLOOD PRESSURE: 80 MMHG | SYSTOLIC BLOOD PRESSURE: 130 MMHG

## 2023-02-07 DIAGNOSIS — Z86.39 PERSONAL HISTORY OF OTHER ENDOCRINE, NUTRITIONAL AND METABOLIC DISEASE: ICD-10-CM

## 2023-02-07 PROCEDURE — 93000 ELECTROCARDIOGRAM COMPLETE: CPT

## 2023-02-07 PROCEDURE — 99215 OFFICE O/P EST HI 40 MIN: CPT

## 2023-02-07 PROCEDURE — 36415 COLL VENOUS BLD VENIPUNCTURE: CPT

## 2023-02-09 PROBLEM — Z86.39 HISTORY OF TYPE 2 DIABETES MELLITUS: Status: RESOLVED | Noted: 2023-02-07 | Resolved: 2023-02-09

## 2023-02-09 NOTE — DISCUSSION/SUMMARY
[EKG obtained to assist in diagnosis and management of assessed problem(s)] : EKG obtained to assist in diagnosis and management of assessed problem(s) [FreeTextEntry1] : I asked the patient to return in the next 2-3 months for follow-up stress isotope study, given his history of diabetes and his complex cardiac history including prior infarct, prior CABG, and prior stent placement.\par \par We will request copies of records from [facility] in [facility] where his stents were performed and from [facility], where he believes his coronary artery bypass was performed. \par \par We will send out blood work today.\par \par At his next visit, we will also arrange for an echocardiogram to further assess myocardial function, as well as valvular function.\par \par He will continue his present regimen of medications.\par \par 70 minutes spent on today's office visit. \par \par He will return again in 2 to 3 months for follow-up visit with echo & a nuclear stress test.

## 2023-02-09 NOTE — REASON FOR VISIT
[FreeTextEntry1] : \real Rafa Farfan presents for cardiac evaluation regarding a hx. of CAD s/p CABG in 2004 and subsequently a myocardial infarction requiring urgent stent placement in 2013.  History also includes hypertension, hyperlipidemia and diabetes.

## 2023-02-09 NOTE — HISTORY OF PRESENT ILLNESS
[FreeTextEntry1] : Mr. Farfan has a hx. of CAD and first came to the attention of physicians in 2004.  Cholesterol levels were found to be elevated at that time.  Apparently further work-up was done, the details of which he does not recall.  As best he remembers, the work-up was done through [facility] and apparently this led to CABG in 2004; the details of the operation itself are not available at this time.\par \par In 2013, he was hospitalized at [facility].  Apparently he presented with unstable angina/myocardial infarction.  Stents were placed at that time; he has stent cards that indicate that Resolute stents were placed in the left main and in the proximal LAD.  \par \par History includes hypertension and hyperlipidemia, and over the past 5 years diabetes.\par \par In our EMR, a stress isotope study was done in November 2019, as requested by Dr. Whitt.  He exercised for 8 minutes and 19 seconds to a peak heart rate of 131 bpm.  Non-diagnostic horizontal ST depressions were seen; perfusion scans demonstrated a left ventricle that was normal in size.  Large, moderate to severe defects were described in the inferior, inferolateral, apical/lateral and basal inferolateral septal walls, that were partially reversible, interpreted as consistent with infarct with moderate isiah-infarct ischemia.  Also, a small, mild defect was seen in the mid to distal anteroseptal wall that was mostly affected consistent with infarct and minimal isiah-infarct ischemia.  Overall ejection fraction was 54%, with hypokinesis affecting the inferior wall segments and the apex.\par \par He tells me that 5 years ago he was diagnosed with diabetes for which he takes metformin.\par \par For the most part, he has been well since his stent procedure in 2013.  A few weeks ago, he recounts an episode of chest discomfort, lingering over 2 to 3 days continuously.  Symptoms were relieved by ibuprofen.  Physical activity such as walking had no impact on his symptoms.  In retrospect, the likely this was a nonischemic pain.\par \par At present, he is back to his normal activities without any symptoms at all.  Currently he has no exertional chest discomfort or dyspnea.  He describes no palpitations.

## 2023-02-10 LAB
ALBUMIN SERPL ELPH-MCNC: 4.6 G/DL
ALP BLD-CCNC: 59 U/L
ALT SERPL-CCNC: 30 U/L
ANION GAP SERPL CALC-SCNC: 12 MMOL/L
AST SERPL-CCNC: 25 U/L
BASOPHILS # BLD AUTO: 0.09 K/UL
BASOPHILS NFR BLD AUTO: 0.9 %
BILIRUB SERPL-MCNC: 0.7 MG/DL
BUN SERPL-MCNC: 15 MG/DL
CALCIUM SERPL-MCNC: 10 MG/DL
CHLORIDE SERPL-SCNC: 102 MMOL/L
CHOLEST SERPL-MCNC: 127 MG/DL
CO2 SERPL-SCNC: 25 MMOL/L
CREAT SERPL-MCNC: 0.84 MG/DL
EGFR: 93 ML/MIN/1.73M2
EOSINOPHIL # BLD AUTO: 1.38 K/UL
EOSINOPHIL NFR BLD AUTO: 13.7 %
ESTIMATED AVERAGE GLUCOSE: 169 MG/DL
GLUCOSE SERPL-MCNC: 145 MG/DL
HBA1C MFR BLD HPLC: 7.5 %
HCT VFR BLD CALC: 41.5 %
HDLC SERPL-MCNC: 39 MG/DL
HGB BLD-MCNC: 12.8 G/DL
IMM GRANULOCYTES NFR BLD AUTO: 0.4 %
LDLC SERPL CALC-MCNC: 73 MG/DL
LDLC SERPL DIRECT ASSAY-MCNC: 69 MG/DL
LYMPHOCYTES # BLD AUTO: 2.42 K/UL
LYMPHOCYTES NFR BLD AUTO: 24 %
MAN DIFF?: NORMAL
MCHC RBC-ENTMCNC: 25.3 PG
MCHC RBC-ENTMCNC: 30.8 GM/DL
MCV RBC AUTO: 82 FL
MONOCYTES # BLD AUTO: 0.89 K/UL
MONOCYTES NFR BLD AUTO: 8.8 %
NEUTROPHILS # BLD AUTO: 5.26 K/UL
NEUTROPHILS NFR BLD AUTO: 52.2 %
NONHDLC SERPL-MCNC: 87 MG/DL
PLATELET # BLD AUTO: 258 K/UL
POTASSIUM SERPL-SCNC: 5.3 MMOL/L
PROT SERPL-MCNC: 7.7 G/DL
RBC # BLD: 5.06 M/UL
RBC # FLD: 15 %
SODIUM SERPL-SCNC: 139 MMOL/L
TRIGL SERPL-MCNC: 70 MG/DL
WBC # FLD AUTO: 10.08 K/UL

## 2023-03-01 ENCOUNTER — APPOINTMENT (OUTPATIENT)
Dept: INTERNAL MEDICINE | Facility: CLINIC | Age: 73
End: 2023-03-01
Payer: MEDICARE

## 2023-03-01 VITALS — SYSTOLIC BLOOD PRESSURE: 125 MMHG | DIASTOLIC BLOOD PRESSURE: 70 MMHG

## 2023-03-01 VITALS
HEIGHT: 68 IN | TEMPERATURE: 97.2 F | HEART RATE: 68 BPM | WEIGHT: 173 LBS | BODY MASS INDEX: 26.22 KG/M2 | OXYGEN SATURATION: 98 % | DIASTOLIC BLOOD PRESSURE: 74 MMHG | SYSTOLIC BLOOD PRESSURE: 142 MMHG

## 2023-03-01 DIAGNOSIS — Z82.3 FAMILY HISTORY OF STROKE: ICD-10-CM

## 2023-03-01 DIAGNOSIS — Z23 ENCOUNTER FOR IMMUNIZATION: ICD-10-CM

## 2023-03-01 DIAGNOSIS — Z78.9 OTHER SPECIFIED HEALTH STATUS: ICD-10-CM

## 2023-03-01 DIAGNOSIS — Z87.891 PERSONAL HISTORY OF NICOTINE DEPENDENCE: ICD-10-CM

## 2023-03-01 DIAGNOSIS — Z00.00 ENCOUNTER FOR GENERAL ADULT MEDICAL EXAMINATION W/OUT ABNORMAL FINDINGS: ICD-10-CM

## 2023-03-01 PROCEDURE — G0439: CPT

## 2023-03-01 PROCEDURE — 90471 IMMUNIZATION ADMIN: CPT

## 2023-03-01 PROCEDURE — 36415 COLL VENOUS BLD VENIPUNCTURE: CPT

## 2023-03-01 PROCEDURE — 90677 PCV20 VACCINE IM: CPT

## 2023-03-01 PROCEDURE — 90472 IMMUNIZATION ADMIN EACH ADD: CPT

## 2023-03-01 PROCEDURE — 90715 TDAP VACCINE 7 YRS/> IM: CPT

## 2023-03-01 RX ORDER — CYCLOBENZAPRINE HYDROCHLORIDE 5 MG/1
5 TABLET, FILM COATED ORAL
Qty: 14 | Refills: 0 | Status: COMPLETED | COMMUNITY
Start: 2022-01-26 | End: 2023-03-01

## 2023-03-05 ENCOUNTER — NON-APPOINTMENT (OUTPATIENT)
Age: 73
End: 2023-03-05

## 2023-03-10 ENCOUNTER — APPOINTMENT (OUTPATIENT)
Dept: ENDOCRINOLOGY | Facility: CLINIC | Age: 73
End: 2023-03-10
Payer: MEDICARE

## 2023-03-10 VITALS
TEMPERATURE: 98.1 F | WEIGHT: 175 LBS | HEIGHT: 68 IN | HEART RATE: 69 BPM | BODY MASS INDEX: 26.52 KG/M2 | SYSTOLIC BLOOD PRESSURE: 120 MMHG | DIASTOLIC BLOOD PRESSURE: 60 MMHG | OXYGEN SATURATION: 97 %

## 2023-03-10 LAB
GLUCOSE BLDC GLUCOMTR-MCNC: 118
HBA1C MFR BLD HPLC: 7.6

## 2023-03-10 PROCEDURE — 99204 OFFICE O/P NEW MOD 45 MIN: CPT

## 2023-03-10 PROCEDURE — 82962 GLUCOSE BLOOD TEST: CPT

## 2023-03-10 PROCEDURE — 83036 HEMOGLOBIN GLYCOSYLATED A1C: CPT | Mod: QW

## 2023-03-10 NOTE — HISTORY OF PRESENT ILLNESS
[FreeTextEntry1] : 72 yearM here for assessment for Type 2 diabetes mellitus\par \par Patient with past medical hx as below, remarkable for CAD s/p CABG , s/p PCI to LAD in , HTN, HLD, OA, here for first visit for DM \par \par Vision problems: stable \par High blood pressure: yes \par Extreme hunger: no \par Frequent and/or recurring urinary infections:  no \par Skin infections:  no  \par Slow healing of cuts:  no \par \par  \par Screening \par Ophthalmology: follows/\par Podiatry: follows/ due for visit \par LDL: 73, on statin\par Microalbumin: Cr:  on ACE-I \par EGFR: 93\par \par \par Current diabetic medication regimen (verified with patient): \par metformin 500mg po bid \par \par \par \par SMBG ranges (glucometer): reported\par \par Morning fastin-140 mg/dl  avg, infrequent checks, does not check PP\par \par \par

## 2023-03-23 ENCOUNTER — APPOINTMENT (OUTPATIENT)
Dept: GASTROENTEROLOGY | Facility: CLINIC | Age: 73
End: 2023-03-23
Payer: MEDICARE

## 2023-03-23 VITALS
WEIGHT: 175 LBS | HEART RATE: 63 BPM | TEMPERATURE: 97.3 F | HEIGHT: 68 IN | BODY MASS INDEX: 26.52 KG/M2 | DIASTOLIC BLOOD PRESSURE: 72 MMHG | SYSTOLIC BLOOD PRESSURE: 149 MMHG | OXYGEN SATURATION: 98 %

## 2023-03-23 DIAGNOSIS — Z12.12 ENCOUNTER FOR SCREENING FOR MALIGNANT NEOPLASM OF COLON: ICD-10-CM

## 2023-03-23 DIAGNOSIS — E11.9 TYPE 2 DIABETES MELLITUS W/OUT COMPLICATIONS: ICD-10-CM

## 2023-03-23 DIAGNOSIS — Z12.11 ENCOUNTER FOR SCREENING FOR MALIGNANT NEOPLASM OF COLON: ICD-10-CM

## 2023-03-23 PROCEDURE — 99204 OFFICE O/P NEW MOD 45 MIN: CPT

## 2023-03-23 RX ORDER — POLYETHYLENE GLYCOL 3350, SODIUM CHLORIDE, SODIUM BICARBONATE AND POTASSIUM CHLORIDE WITH LEMON FLAVOR 420; 11.2; 5.72; 1.48 G/4L; G/4L; G/4L; G/4L
420 POWDER, FOR SOLUTION ORAL
Qty: 1 | Refills: 0 | Status: ACTIVE | COMMUNITY
Start: 2023-03-23 | End: 1900-01-01

## 2023-03-23 NOTE — ASSESSMENT
[FreeTextEntry1] : Screening colonoscopy.  The importance of colon cancer screening and the colonoscopy prep was discussed with the patient.  He understands and all questions were answered.\par Coronary artery disease status post CABG and cardiac stenting on Plavix and low-dose aspirin.  Cardiology clearance requested.

## 2023-03-23 NOTE — HISTORY OF PRESENT ILLNESS
[FreeTextEntry1] : 72-year-old man with NIDDM presents for screening colonoscopy.  His last colonoscopy was over 10 years ago.  He denies rectal bleeding, melena or hematemesis.  He has a history of coronary artery disease for which she is on clopidogrel and low-dose aspirin.  He is status post CABG and 6 cardiac stenting in the past.  He denies rectal bleeding, melena or hematemesis.

## 2023-03-23 NOTE — PHYSICAL EXAM
[Alert] : alert [Normal Voice/Communication] : normal voice/communication [Healthy Appearing] : healthy appearing [No Acute Distress] : no acute distress [Sclera] : the sclera and conjunctiva were normal [Hearing Threshold Finger Rub Not [place]] : hearing was normal [Normal Lips/Gums] : the lips and gums were normal [Oropharynx] : the oropharynx was normal [Normal Appearance] : the appearance of the neck was normal [No Neck Mass] : no neck mass was observed [No Respiratory Distress] : no respiratory distress [No Acc Muscle Use] : no accessory muscle use [Respiration, Rhythm And Depth] : normal respiratory rhythm and effort [Auscultation Breath Sounds / Voice Sounds] : lungs were clear to auscultation bilaterally [Heart Rate And Rhythm] : heart rate was normal and rhythm regular [Normal S1, S2] : normal S1 and S2 [Murmurs] : no murmurs [None] : no edema [Bowel Sounds] : normal bowel sounds [Abdomen Tenderness] : non-tender [No Masses] : no abdominal mass palpated [Abdomen Soft] : soft [] : no hepatosplenomegaly [Normal Sphincter Tone] : normal sphincter tone [Cervical Lymph Nodes Enlarged Posterior Bilaterally] : no posterior cervical lymphadenopathy [Axillary Lymph Nodes Enlarged Bilaterally] : no axillary lymphadenopathy [Abnormal Walk] : normal gait [Cranial Nerves Intact] : cranial nerves 2-12 were intact [Normal] : oriented to person, place, and time [Oriented To Time, Place, And Person] : oriented to person, place, and time [Normal Mood] : the mood was normal

## 2023-04-08 NOTE — ED PROVIDER NOTE - INTERPRETATION
Has noticed that flares are becoming more frequent in the last year.  Relieved with valacyclovir.  Requesting refill today.   normal sinus rhythm

## 2023-04-26 ENCOUNTER — APPOINTMENT (OUTPATIENT)
Dept: CARDIOLOGY | Facility: CLINIC | Age: 73
End: 2023-04-26

## 2023-04-27 ENCOUNTER — APPOINTMENT (OUTPATIENT)
Dept: INTERNAL MEDICINE | Facility: CLINIC | Age: 73
End: 2023-04-27

## 2023-05-04 ENCOUNTER — APPOINTMENT (OUTPATIENT)
Dept: CARDIOLOGY | Facility: CLINIC | Age: 73
End: 2023-05-04
Payer: MEDICARE

## 2023-05-04 PROCEDURE — A9500: CPT

## 2023-05-04 PROCEDURE — 78452 HT MUSCLE IMAGE SPECT MULT: CPT

## 2023-05-04 PROCEDURE — 93015 CV STRESS TEST SUPVJ I&R: CPT

## 2023-05-12 ENCOUNTER — NON-APPOINTMENT (OUTPATIENT)
Age: 73
End: 2023-05-12

## 2023-05-24 LAB
25(OH)D3 SERPL-MCNC: 38 NG/ML
ALBUMIN MFR SERPL ELPH: 55.7 %
ALBUMIN SERPL-MCNC: 4.2 G/DL
ALBUMIN/GLOB SERPL: 1.2 RATIO
ALPHA1 GLOB MFR SERPL ELPH: 3.8 %
ALPHA1 GLOB SERPL ELPH-MCNC: 0.3 G/DL
ALPHA2 GLOB MFR SERPL ELPH: 10.1 %
ALPHA2 GLOB SERPL ELPH-MCNC: 0.8 G/DL
APPEARANCE: CLEAR
B-GLOBULIN MFR SERPL ELPH: 11.2 %
B-GLOBULIN SERPL ELPH-MCNC: 0.9 G/DL
BACTERIA: NEGATIVE
BILIRUBIN URINE: NEGATIVE
BLOOD URINE: NEGATIVE
COLOR: YELLOW
DEPRECATED KAPPA LC FREE/LAMBDA SER: 1.92 RATIO
FERRITIN SERPL-MCNC: 47 NG/ML
FOLATE SERPL-MCNC: 15.9 NG/ML
GAMMA GLOB FLD ELPH-MCNC: 1.5 G/DL
GAMMA GLOB MFR SERPL ELPH: 19.2 %
GLUCOSE QUALITATIVE U: NEGATIVE
HGB A MFR BLD: 97.2 %
HGB A2 MFR BLD: 2.8 %
HGB FRACT BLD-IMP: NORMAL
HYALINE CASTS: 0 /LPF
IGA SER QL IEP: 158 MG/DL
IGG SER QL IEP: 1136 MG/DL
IGM SER QL IEP: 23 MG/DL
INTERPRETATION SERPL IEP-IMP: NORMAL
IRON SATN MFR SERPL: 22 %
IRON SERPL-MCNC: 78 UG/DL
KAPPA LC CSF-MCNC: 1.92 MG/DL
KAPPA LC SERPL-MCNC: 3.69 MG/DL
KETONES URINE: NEGATIVE
LEUKOCYTE ESTERASE URINE: NEGATIVE
M PROTEIN SPEC IFE-MCNC: NORMAL
MICROSCOPIC-UA: NORMAL
NITRITE URINE: NEGATIVE
PH URINE: 6
PROT SERPL-MCNC: 7.6 G/DL
PROT SERPL-MCNC: 7.6 G/DL
PROTEIN URINE: NORMAL
RED BLOOD CELLS URINE: 2 /HPF
SPECIFIC GRAVITY URINE: 1.02
SQUAMOUS EPITHELIAL CELLS: 0 /HPF
T4 FREE SERPL-MCNC: 1.3 NG/DL
TIBC SERPL-MCNC: 361 UG/DL
TSH SERPL-ACNC: 0.85 UIU/ML
UIBC SERPL-MCNC: 283 UG/DL
UROBILINOGEN URINE: NORMAL
VIT B12 SERPL-MCNC: 716 PG/ML
WHITE BLOOD CELLS URINE: 3 /HPF

## 2023-05-24 NOTE — ASSESSMENT
[FreeTextEntry1] : (1) HCM - discussed diet, exercise, weight maintenance.  Labs drawn in office as below (labs done by Dr. Jose not repeated today).  Patient received 3 doses of the initial COVID-19 vaccine, and I encouraged him to get the bivalent booster.  He reports an influenza vaccination about Oct 2022.  Prevnar-20 and Tdap given today.  Can discuss Shingrix in a future visit.  Referral given for screening colonoscopy (last one >10 years ago per patient report).  Referral given for Audiology exam.  Will discuss Advance Directives in a future visit.\par \par (2) CV - patient with ASHD, HTN, hyperlipidemia.  Patient's regimen to be monitored by Dr. Melendez.  LDL 73 recently, and BP adequately controlled.\par \par (3) Type 2 DM - patient's A1c 7.5%, and he has an Endocrine appointment in about 2 weeks.  Patient currently on metformin once daily.  He is on a statin with LDL<100.  BP control adequate, and patient is on an ACEI.  Patient reminded to have at least annual eye exams.

## 2023-05-24 NOTE — HISTORY OF PRESENT ILLNESS
[Other: _____] : [unfilled] [de-identified] : New Patient - 1st visit to [facility] Physician [facility] [place].  Prior PCP Dr. Ricardo Arguello ([place]); was also cardiologist.  Patient is a 72 year old male with a history of ASHD, s/p CABG 2004, s/p stenting 2013 to LAD (after an episode of unstable angina), Type 2 DM, HTN, hyperlipidemia, OA of bilateral knees.  He sees Dr. Dunham (Ortho) and has had knee injections recently.\par \par Patient received the influenza vaccination about Oct 2022.  Had 3 doses of the initial COVID-19 vaccine and will forward the dates.  He hasn't had the bivalent COVID-19 vaccine.  No recent Tdap.  He does not recall a Pneumovax or Shingrix.

## 2023-05-24 NOTE — PHYSICAL EXAM
[No Acute Distress] : no acute distress [Well Nourished] : well nourished [Well Developed] : well developed [Well-Appearing] : well-appearing [Normal Voice/Communication] : normal voice/communication [Normal Sclera/Conjunctiva] : normal sclera/conjunctiva [PERRL] : pupils equal round and reactive to light [EOMI] : extraocular movements intact [Normal Outer Ear/Nose] : the outer ears and nose were normal in appearance [Normal Oropharynx] : the oropharynx was normal [Normal TMs] : both tympanic membranes were normal [No JVD] : no jugular venous distention [No Lymphadenopathy] : no lymphadenopathy [Supple] : supple [Thyroid Normal, No Nodules] : the thyroid was normal and there were no nodules present [No Respiratory Distress] : no respiratory distress  [No Accessory Muscle Use] : no accessory muscle use [Clear to Auscultation] : lungs were clear to auscultation bilaterally [Normal Rate] : normal rate  [Regular Rhythm] : with a regular rhythm [Normal S1, S2] : normal S1 and S2 [No Murmur] : no murmur heard [No Carotid Bruits] : no carotid bruits [No Abdominal Bruit] : a ~M bruit was not heard ~T in the abdomen [No Varicosities] : no varicosities [Pedal Pulses Present] : the pedal pulses are present [No Edema] : there was no peripheral edema [No Palpable Aorta] : no palpable aorta [No Extremity Clubbing/Cyanosis] : no extremity clubbing/cyanosis [Soft] : abdomen soft [Non Tender] : non-tender [Non-distended] : non-distended [No Masses] : no abdominal mass palpated [No HSM] : no HSM [Normal Bowel Sounds] : normal bowel sounds [Urethral Meatus] : meatus normal [Penis Abnormality] : normal uncircumcised penis [Urinary Bladder Findings] : the bladder was normal on palpation [Scrotum] : the scrotum was normal [Testes Tenderness] : no tenderness of the testes [Testes Mass (___cm)] : there were no testicular masses [Prostate Tenderness] : the prostate was not tender [No Prostate Nodules] : no prostate nodules [Normal Posterior Cervical Nodes] : no posterior cervical lymphadenopathy [Normal Anterior Cervical Nodes] : no anterior cervical lymphadenopathy [No CVA Tenderness] : no CVA  tenderness [No Spinal Tenderness] : no spinal tenderness [No Joint Swelling] : no joint swelling [Grossly Normal Strength/Tone] : grossly normal strength/tone [No Rash] : no rash [Coordination Grossly Intact] : coordination grossly intact [No Focal Deficits] : no focal deficits [Normal Gait] : normal gait [Deep Tendon Reflexes (DTR)] : deep tendon reflexes were 2+ and symmetric [Normal Affect] : the affect was normal [Normal Insight/Judgement] : insight and judgment were intact [de-identified] : Small L inguinal hernia.

## 2023-05-24 NOTE — HEALTH RISK ASSESSMENT
[Very Good] : ~his/her~  mood as very good [No falls in past year] : Patient reported no falls in the past year [0] : 2) Feeling down, depressed, or hopeless: Not at all (0) [PHQ-2 Negative - No further assessment needed] : PHQ-2 Negative - No further assessment needed [HIV test declined] : HIV test declined [Hepatitis C test offered] : Hepatitis C test offered [] :  [Fully functional (bathing, dressing, toileting, transferring, walking, feeding)] : Fully functional (bathing, dressing, toileting, transferring, walking, feeding) [Fully functional (using the telephone, shopping, preparing meals, housekeeping, doing laundry, using] : Fully functional and needs no help or supervision to perform IADLs (using the telephone, shopping, preparing meals, housekeeping, doing laundry, using transportation, managing medications and managing finances) [Reports changes in hearing] : Reports changes in hearing [Smoke Detector] : smoke detector [Carbon Monoxide Detector] : carbon monoxide detector [Seat Belt] :  uses seat belt [Sunscreen] : uses sunscreen [No] : In the past 12 months have you used drugs other than those required for medical reasons? No [de-identified] : Walks every morning for 45-60 minutes. [[id number]] : 0 [Change in mental status noted] : No change in mental status noted [Retired] : retired [Feels Safe at Home] : Feels safe at home [Reports changes in vision] : Reports no changes in vision [Reports changes in dental health] : Reports no changes in dental health [ColonoscopyComments] : About 10-15 years ago. [FreeTextEntry2] : Retired . [de-identified] : [employer] [facility] [place].  Last seen 1 year ago. [de-identified] : Not seen recently. [FreeTextEntry4] : Will discuss in a future visit.

## 2023-06-21 ENCOUNTER — APPOINTMENT (OUTPATIENT)
Dept: INTERNAL MEDICINE | Facility: CLINIC | Age: 73
End: 2023-06-21

## 2023-06-27 ENCOUNTER — APPOINTMENT (OUTPATIENT)
Dept: INTERNAL MEDICINE | Facility: CLINIC | Age: 73
End: 2023-06-27

## 2023-07-05 ENCOUNTER — APPOINTMENT (OUTPATIENT)
Dept: GASTROENTEROLOGY | Facility: AMBULATORY MEDICAL SERVICES | Age: 73
End: 2023-07-05
Payer: MEDICARE

## 2023-07-05 PROCEDURE — 45378 DIAGNOSTIC COLONOSCOPY: CPT | Mod: PT

## 2023-08-28 RX ORDER — METFORMIN HYDROCHLORIDE 500 MG/1
500 TABLET, COATED ORAL
Qty: 360 | Refills: 1 | Status: ACTIVE | COMMUNITY
Start: 1900-01-01 | End: 1900-01-01

## 2023-09-25 ENCOUNTER — APPOINTMENT (OUTPATIENT)
Dept: CARDIOLOGY | Facility: CLINIC | Age: 73
End: 2023-09-25

## 2023-09-28 NOTE — ED ADULT TRIAGE NOTE - NS ED NURSE DIRECT TO ROOM YN
Yes Balbir Wilson  Orthopaedic Surgery  [address] [place] [address]  Phone: [phone]  Fax: [phone]  Follow Up Time:

## 2024-01-24 ENCOUNTER — NON-APPOINTMENT (OUTPATIENT)
Age: 74
End: 2024-01-24

## 2024-01-26 ENCOUNTER — APPOINTMENT (OUTPATIENT)
Dept: INTERNAL MEDICINE | Facility: CLINIC | Age: 74
End: 2024-01-26

## 2024-02-08 PROBLEM — Z95.5 S/P DRUG ELUTING CORONARY STENT PLACEMENT: Status: ACTIVE | Noted: 2024-02-08

## 2024-02-08 PROBLEM — Z95.1 H/O CORONARY ARTERY BYPASS SURGERY: Status: ACTIVE | Noted: 2019-10-31

## 2024-02-08 PROBLEM — I10 HTN (HYPERTENSION): Status: ACTIVE | Noted: 2019-12-06

## 2024-02-08 PROBLEM — E78.5 HYPERLIPIDEMIA: Status: ACTIVE | Noted: 2020-02-27

## 2024-02-08 PROBLEM — I25.10 ATHEROSCLEROSIS OF CORONARY ARTERY: Status: ACTIVE | Noted: 2019-10-31

## 2024-02-08 NOTE — PHYSICAL EXAM
[General Appearance - Well Developed] : well developed [Normal Appearance] : normal appearance [Well Groomed] : well groomed [No Deformities] : no deformities [General Appearance - Well Nourished] : well nourished [General Appearance - In No Acute Distress] : no acute distress [Normal Conjunctiva] : the conjunctiva exhibited no abnormalities [Eyelids - No Xanthelasma] : the eyelids demonstrated no xanthelasmas [Normal Oral Mucosa] : normal oral mucosa [No Oral Pallor] : no oral pallor [No Oral Cyanosis] : no oral cyanosis [Normal Jugular Venous A Waves Present] : normal jugular venous A waves present [Normal Jugular Venous V Waves Present] : normal jugular venous V waves present [No Jugular Venous Triana A Waves] : no jugular venous triana A waves [Respiration, Rhythm And Depth] : normal respiratory rhythm and effort [Exaggerated Use Of Accessory Muscles For Inspiration] : no accessory muscle use [Auscultation Breath Sounds / Voice Sounds] : lungs were clear to auscultation bilaterally [Heart Rate And Rhythm] : heart rate and rhythm were normal [Heart Sounds] : normal S1 and S2 [Murmurs] : no murmurs present [Abdomen Soft] : soft [Abdomen Tenderness] : non-tender [Abdomen Mass (___ Cm)] : no abdominal mass palpated [Abnormal Walk] : normal gait [Gait - Sufficient For Exercise Testing] : the gait was sufficient for exercise testing [Nail Clubbing] : no clubbing of the fingernails [Cyanosis, Localized] : no localized cyanosis [Petechial Hemorrhages (___cm)] : no petechial hemorrhages [Skin Color & Pigmentation] : normal skin color and pigmentation [] : no rash [No Venous Stasis] : no venous stasis [Skin Lesions] : no skin lesions [No Skin Ulcers] : no skin ulcer [No Xanthoma] : no  xanthoma was observed [Oriented To Time, Place, And Person] : oriented to person, place, and time [Affect] : the affect was normal [Mood] : the mood was normal [No Anxiety] : not feeling anxious

## 2024-02-09 ENCOUNTER — APPOINTMENT (OUTPATIENT)
Dept: CARDIOLOGY | Facility: CLINIC | Age: 74
End: 2024-02-09
Payer: MEDICARE

## 2024-02-09 ENCOUNTER — NON-APPOINTMENT (OUTPATIENT)
Age: 74
End: 2024-02-09

## 2024-02-09 VITALS
WEIGHT: 162 LBS | OXYGEN SATURATION: 98 % | DIASTOLIC BLOOD PRESSURE: 70 MMHG | HEART RATE: 61 BPM | BODY MASS INDEX: 24.63 KG/M2 | SYSTOLIC BLOOD PRESSURE: 112 MMHG

## 2024-02-09 DIAGNOSIS — I10 ESSENTIAL (PRIMARY) HYPERTENSION: ICD-10-CM

## 2024-02-09 DIAGNOSIS — Z95.5 PRESENCE OF CORONARY ANGIOPLASTY IMPLANT AND GRAFT: ICD-10-CM

## 2024-02-09 DIAGNOSIS — E78.5 HYPERLIPIDEMIA, UNSPECIFIED: ICD-10-CM

## 2024-02-09 DIAGNOSIS — I25.10 ATHEROSCLEROTIC HEART DISEASE OF NATIVE CORONARY ARTERY W/OUT ANGINA PECTORIS: ICD-10-CM

## 2024-02-09 DIAGNOSIS — Z95.1 PRESENCE OF AORTOCORONARY BYPASS GRAFT: ICD-10-CM

## 2024-02-09 PROCEDURE — 36415 COLL VENOUS BLD VENIPUNCTURE: CPT

## 2024-02-09 PROCEDURE — 93000 ELECTROCARDIOGRAM COMPLETE: CPT

## 2024-02-09 PROCEDURE — G2211 COMPLEX E/M VISIT ADD ON: CPT

## 2024-02-09 PROCEDURE — 99215 OFFICE O/P EST HI 40 MIN: CPT

## 2024-02-09 RX ORDER — METOPROLOL SUCCINATE 25 MG/1
25 TABLET, EXTENDED RELEASE ORAL DAILY
Qty: 90 | Refills: 2 | Status: ACTIVE | COMMUNITY
Start: 1900-01-01 | End: 1900-01-01

## 2024-02-09 RX ORDER — ATORVASTATIN CALCIUM 80 MG/1
80 TABLET, FILM COATED ORAL
Qty: 90 | Refills: 2 | Status: ACTIVE | COMMUNITY
Start: 1900-01-01 | End: 1900-01-01

## 2024-02-09 RX ORDER — LISINOPRIL 5 MG/1
5 TABLET ORAL DAILY
Qty: 90 | Refills: 2 | Status: ACTIVE | COMMUNITY
Start: 1900-01-01 | End: 1900-01-01

## 2024-02-09 RX ORDER — ISOSORBIDE MONONITRATE 30 MG/1
30 TABLET, EXTENDED RELEASE ORAL DAILY
Qty: 45 | Refills: 2 | Status: ACTIVE | COMMUNITY
Start: 1900-01-01 | End: 1900-01-01

## 2024-02-09 RX ORDER — CLOPIDOGREL BISULFATE 75 MG/1
75 TABLET, FILM COATED ORAL DAILY
Qty: 90 | Refills: 2 | Status: ACTIVE | COMMUNITY
Start: 1900-01-01 | End: 1900-01-01

## 2024-02-09 NOTE — REASON FOR VISIT
[FreeTextEntry1] :   Rafa Farfan returns for f/u re CAD s/p CABG in 2004 and subsequently a myocardial infarction requiring urgent stent placement in 2013.  History also includes hypertension, hyperlipidemia and diabetes.

## 2024-02-09 NOTE — ASSESSMENT
[FreeTextEntry1] : CAD      s/p CABG 2006 at [facility] with LIMA to LAD for 60% L main dz.        MI in 2013, s/p PCI/YUNIER at [facility] to L main and proximal LAD (severe dz. seen in LIMA).      Cath in 2016 at [facility] - severe dz. in the LIMA to LAD, patent stent to the L main (no obstruction), dz. in the proximal LAD (<30% stenosis in stent), & patent mid RCA stent; 50-60% D1 disease (vessel was jailed by the LAD stent( and 80-90% dz. in OM1 branch of the non-dominant circ.      Nuclear stress November 2019 requested by Dr. Whitt - exercised for 8 min. & 19 sec. to peak  bpm. Non-diagnostic horizontal ST depressions were seen; perfusion scans demonstrated a left ventricle that was normal in size. Large, moderate to severe defects were described in the inferior, infero-lateral, apical/lateral and basal infero-lateral septal walls, that were partially reversible, interpreted as consistent with infarct with moderate isiah-infarct ischemia. Also, a small, mild defect was seen in the mid to distal mian-septal wall that was mostly affected consistent with infarct and minimal isiah-infarct ischemia. Overall ejection fraction was 54%, with hypokinesis affecting the inferior wall segments and the apex.      Pharm nuclear stress here May 2023 - showed defect in the inferior wall consistent with a small area of infarction with mild isiah-infarct ischemia. Overall, left ventricular ejection fraction was preserved at 70%, with hypokinesis of the basal inferior and basal inferolateral-lateral walls.  This appeared to be a a favorable test result, as no significant ischemia was seen despite the patient's complex cardiac history and multiple revascularization procedures in the past.      HTN  HLD  DM

## 2024-02-09 NOTE — HISTORY OF PRESENT ILLNESS
[FreeTextEntry1] : Mr. Farfan has a hx. of CAD.  Available records, received from Dr. Ricardo Huang, reports a cardiac catheterization on July 1, 2006 at [facility]. described a 60% lesion in the ostium of the left main coronary artery and a 30% proximal and 40% mid LAD lesion. Luminal irregularities were seen in the circumflex and right coronary vessels.  Apparently, CABG was performed with LIMA to LAD at that time.  In 2013, he was hospitalized at [facility] for unstable angina/myocardial infarction.  Resolute stents were placed in the L elle and proximal LAD apparently because of severe dz. in the LIMA graft.    Cardiac catheterization at [place] on October 20, 2016 recounted the intervening history of a single-vessel CABG with LIMA to the LAD. That cath in 2016 reported the severe dz. in the LIMA to LAD and alos described a patent stent to the L main (no obstruction seen at that time).  Disease was reported in the proximal LAD (<30% stenosis, in stent) & also described a patent mid RCA stent. Other findings included 50=60% D1 disease; this vessel was jailed by the LAD stent. 80-90% narrowing was seen in the OM1 branch of the non-dominant circ.  Echocardiogram on February 28, 2018 reported preserved LV systolic function with an EF of 55-60%. Mild diastolic LV dysfunction was noted. Mild mitral valve insufficiency was seen.  History also includes hypertension, hyperlipidemia and DM since 2018.    A stress isotope study was done in November 2019, requested by Dr. Whitt.  He exercised for 8 minutes and 19 seconds to a peak heart rate of 131 bpm.  Non-diagnostic horizontal ST depressions were seen; perfusion scans demonstrated a left ventricle that was normal in size.  Large, moderate to severe defects were described in the inferior, inferolateral, apical/lateral and basal inferolateral septal walls, that were partially reversible, interpreted as consistent with infarct with moderate isiah-infarct ischemia.  Also, a small, mild defect was seen in the mid to distal anteroseptal wall that was mostly affected consistent with infarct and minimal isiah-infarct ischemia.  Overall ejection fraction was 54%, with hypokinesis affecting the inferior wall segments and the apex.  Pharm nuclear stress done here in May 2023 showed defect in the inferior wall consistent with a small area of infarction with mild isiah-infarct ischemia. Overall, left ventricular ejection fraction was preserved at 70%, with hypokinesis of the basal inferior and basal inferolateral-lateral walls.  This appeared to be a a favorable test result, as no significant ischemia was seen despite the patient's complex cardiac history and multiple revascularization procedures in the past.  As he returns today, in the company of his youngest son, he has remained well from a cardiac standpoint.  He is able to continue his usual activities without limitation and tells me that he walks each day.  He describes no exertional chest discomfort and no dyspnea on exertion.  He reports no palpitations.  There have been no episodes of orthopnea or PND.  Several months back, he recounts an episode of vertigo which lasted for a day or so.  The symptoms spontaneously resolved.  He found that he felt best if he lay still with his eyes closed.  In retrospect, he has had similar episodes previously.

## 2024-02-09 NOTE — DISCUSSION/SUMMARY
[EKG obtained to assist in diagnosis and management of assessed problem(s)] : EKG obtained to assist in diagnosis and management of assessed problem(s) [FreeTextEntry1] : EKG today shows:  Sinus Bradycardia at 57 bpm.  Normal intervals and axis.  NS ST/T abn; no significant change.  ECHO  PLAN: 1. CAD -  -   Continue aspirin, statin and isosorbide as well as beta-blocker. -   Given the complexity of his coronary artery disease in the setting of diabetes, will have him continue on DAPT, which she seems to tolerate without problem.  2.  HTN - -  Blood pressure in good range today.  Continue lisinopril and beta-blocker.  3.  HLD - -   continue atorvastatin.  4.  DM  -   Reminded to follow-up with Dr. Mckeon in the next 1-2 months. -   He will continue his customary dose of atorvastatin.  Will send out blood work today.   Will renew meds.  40 minutes spent on today's office visit.   He will return again in 6 months for follow-up visit with echo.

## 2024-02-19 LAB
5NT SERPL-CCNC: 3 IU/L
ALBUMIN SERPL ELPH-MCNC: 4.3 G/DL
ALP BLD-CCNC: 54 U/L
ALT SERPL-CCNC: 26 U/L
ANION GAP SERPL CALC-SCNC: 12 MMOL/L
AST SERPL-CCNC: 20 U/L
BILIRUB SERPL-MCNC: 0.7 MG/DL
BUN SERPL-MCNC: 14 MG/DL
CALCIUM SERPL-MCNC: 9.3 MG/DL
CHLORIDE SERPL-SCNC: 102 MMOL/L
CHOLEST SERPL-MCNC: 104 MG/DL
CO2 SERPL-SCNC: 22 MMOL/L
CREAT SERPL-MCNC: 0.82 MG/DL
EGFR: 93 ML/MIN/1.73M2
GLUCOSE SERPL-MCNC: 145 MG/DL
HCT VFR BLD CALC: 37.5 %
HDLC SERPL-MCNC: 39 MG/DL
HGB BLD-MCNC: 12.4 G/DL
LDLC SERPL CALC-MCNC: 49 MG/DL
LDLC SERPL DIRECT ASSAY-MCNC: 55 MG/DL
MCHC RBC-ENTMCNC: 25.9 PG
MCHC RBC-ENTMCNC: 33.1 GM/DL
MCV RBC AUTO: 78.3 FL
NONHDLC SERPL-MCNC: 65 MG/DL
PLATELET # BLD AUTO: 263 K/UL
POTASSIUM SERPL-SCNC: 4.7 MMOL/L
PROT SERPL-MCNC: 7.4 G/DL
RBC # BLD: 4.79 M/UL
RBC # FLD: 14.4 %
SODIUM SERPL-SCNC: 136 MMOL/L
TRIGL SERPL-MCNC: 73 MG/DL
WBC # FLD AUTO: 8.07 K/UL

## 2024-02-20 DIAGNOSIS — D64.9 ANEMIA, UNSPECIFIED: ICD-10-CM

## 2024-08-12 ENCOUNTER — APPOINTMENT (OUTPATIENT)
Dept: CARDIOLOGY | Facility: CLINIC | Age: 74
End: 2024-08-12

## 2024-08-30 ENCOUNTER — TRANSCRIPTION ENCOUNTER (OUTPATIENT)
Age: 74
End: 2024-08-30

## 2024-10-04 ENCOUNTER — TRANSCRIPTION ENCOUNTER (OUTPATIENT)
Age: 74
End: 2024-10-04

## 2024-10-10 ENCOUNTER — TRANSCRIPTION ENCOUNTER (OUTPATIENT)
Age: 74
End: 2024-10-10

## 2024-10-11 ENCOUNTER — APPOINTMENT (OUTPATIENT)
Dept: CARDIOLOGY | Facility: CLINIC | Age: 74
End: 2024-10-11

## 2024-10-11 DIAGNOSIS — Z95.1 PRESENCE OF AORTOCORONARY BYPASS GRAFT: ICD-10-CM

## 2024-10-11 DIAGNOSIS — I25.10 ATHEROSCLEROTIC HEART DISEASE OF NATIVE CORONARY ARTERY W/OUT ANGINA PECTORIS: ICD-10-CM

## 2024-10-11 DIAGNOSIS — I10 ESSENTIAL (PRIMARY) HYPERTENSION: ICD-10-CM

## 2024-10-11 DIAGNOSIS — E78.5 HYPERLIPIDEMIA, UNSPECIFIED: ICD-10-CM

## 2024-11-05 ENCOUNTER — APPOINTMENT (OUTPATIENT)
Dept: INTERNAL MEDICINE | Facility: CLINIC | Age: 74
End: 2024-11-05
Payer: MEDICARE

## 2024-11-05 VITALS
WEIGHT: 157 LBS | HEIGHT: 68 IN | BODY MASS INDEX: 23.79 KG/M2 | DIASTOLIC BLOOD PRESSURE: 68 MMHG | SYSTOLIC BLOOD PRESSURE: 138 MMHG | OXYGEN SATURATION: 98 % | HEART RATE: 51 BPM

## 2024-11-05 DIAGNOSIS — Z92.29 PERSONAL HISTORY OF OTHER DRUG THERAPY: ICD-10-CM

## 2024-11-05 DIAGNOSIS — I25.10 ATHEROSCLEROTIC HEART DISEASE OF NATIVE CORONARY ARTERY W/OUT ANGINA PECTORIS: ICD-10-CM

## 2024-11-05 DIAGNOSIS — Z00.00 ENCOUNTER FOR GENERAL ADULT MEDICAL EXAMINATION W/OUT ABNORMAL FINDINGS: ICD-10-CM

## 2024-11-05 DIAGNOSIS — I10 ESSENTIAL (PRIMARY) HYPERTENSION: ICD-10-CM

## 2024-11-05 DIAGNOSIS — E11.9 TYPE 2 DIABETES MELLITUS W/OUT COMPLICATIONS: ICD-10-CM

## 2024-11-05 DIAGNOSIS — Z23 ENCOUNTER FOR IMMUNIZATION: ICD-10-CM

## 2024-11-05 DIAGNOSIS — E78.5 HYPERLIPIDEMIA, UNSPECIFIED: ICD-10-CM

## 2024-11-05 PROCEDURE — G0439: CPT

## 2024-11-05 PROCEDURE — 36415 COLL VENOUS BLD VENIPUNCTURE: CPT

## 2024-11-05 RX ORDER — BLOOD SUGAR DIAGNOSTIC
STRIP MISCELLANEOUS DAILY
Qty: 1 | Refills: 2 | Status: ACTIVE | COMMUNITY
Start: 2024-11-05 | End: 1900-01-01

## 2024-11-05 RX ORDER — LANCETS 28 GAUGE
EACH MISCELLANEOUS
Qty: 1 | Refills: 2 | Status: ACTIVE | COMMUNITY
Start: 2024-11-05 | End: 1900-01-01

## 2024-11-06 LAB
25(OH)D3 SERPL-MCNC: 38.6 NG/ML
ALBUMIN SERPL ELPH-MCNC: 4.5 G/DL
ALP BLD-CCNC: 57 U/L
ALT SERPL-CCNC: 25 U/L
ANION GAP SERPL CALC-SCNC: 11 MMOL/L
APPEARANCE: CLEAR
AST SERPL-CCNC: 26 U/L
BACTERIA: NEGATIVE /HPF
BASOPHILS # BLD AUTO: 0.06 K/UL
BASOPHILS NFR BLD AUTO: 0.8 %
BILIRUB SERPL-MCNC: 0.8 MG/DL
BILIRUBIN URINE: NEGATIVE
BLOOD URINE: NEGATIVE
BUN SERPL-MCNC: 13 MG/DL
CALCIUM SERPL-MCNC: 9.9 MG/DL
CAST: 1 /LPF
CHLORIDE SERPL-SCNC: 104 MMOL/L
CHOLEST SERPL-MCNC: 108 MG/DL
CO2 SERPL-SCNC: 24 MMOL/L
COLOR: YELLOW
CREAT SERPL-MCNC: 0.82 MG/DL
EGFR: 92 ML/MIN/1.73M2
EOSINOPHIL # BLD AUTO: 1.37 K/UL
EOSINOPHIL NFR BLD AUTO: 17.8 %
EPITHELIAL CELLS: 0 /HPF
ESTIMATED AVERAGE GLUCOSE: 151 MG/DL
GLUCOSE QUALITATIVE U: NEGATIVE MG/DL
GLUCOSE SERPL-MCNC: 135 MG/DL
HBA1C MFR BLD HPLC: 6.9 %
HCT VFR BLD CALC: 40.7 %
HDLC SERPL-MCNC: 43 MG/DL
HGB BLD-MCNC: 12.9 G/DL
IMM GRANULOCYTES NFR BLD AUTO: 0 %
KETONES URINE: NEGATIVE MG/DL
LDLC SERPL CALC-MCNC: 53 MG/DL
LEUKOCYTE ESTERASE URINE: NEGATIVE
LYMPHOCYTES # BLD AUTO: 2.09 K/UL
LYMPHOCYTES NFR BLD AUTO: 27.2 %
MAN DIFF?: NORMAL
MCHC RBC-ENTMCNC: 26.3 PG
MCHC RBC-ENTMCNC: 31.7 G/DL
MCV RBC AUTO: 82.9 FL
MICROSCOPIC-UA: NORMAL
MONOCYTES # BLD AUTO: 0.78 K/UL
MONOCYTES NFR BLD AUTO: 10.1 %
NEUTROPHILS # BLD AUTO: 3.39 K/UL
NEUTROPHILS NFR BLD AUTO: 44.1 %
NITRITE URINE: NEGATIVE
NONHDLC SERPL-MCNC: 65 MG/DL
PH URINE: 6
PLATELET # BLD AUTO: 246 K/UL
POTASSIUM SERPL-SCNC: 4.7 MMOL/L
PROT SERPL-MCNC: 7.6 G/DL
PROTEIN URINE: 30 MG/DL
PSA SERPL-MCNC: 2.99 NG/ML
RBC # BLD: 4.91 M/UL
RBC # FLD: 14 %
RED BLOOD CELLS URINE: 1 /HPF
SODIUM SERPL-SCNC: 140 MMOL/L
SPECIFIC GRAVITY URINE: 1.02
T4 FREE SERPL-MCNC: 1.3 NG/DL
TRIGL SERPL-MCNC: 48 MG/DL
TSH SERPL-ACNC: 0.81 UIU/ML
UROBILINOGEN URINE: 0.2 MG/DL
VIT B12 SERPL-MCNC: 905 PG/ML
WBC # FLD AUTO: 7.69 K/UL
WHITE BLOOD CELLS URINE: 0 /HPF

## 2024-12-04 ENCOUNTER — TRANSCRIPTION ENCOUNTER (OUTPATIENT)
Age: 74
End: 2024-12-04

## 2024-12-05 ENCOUNTER — APPOINTMENT (OUTPATIENT)
Dept: ENDOCRINOLOGY | Facility: CLINIC | Age: 74
End: 2024-12-05
Payer: MEDICARE

## 2024-12-05 VITALS
BODY MASS INDEX: 23.5 KG/M2 | HEART RATE: 85 BPM | HEIGHT: 68 IN | DIASTOLIC BLOOD PRESSURE: 82 MMHG | SYSTOLIC BLOOD PRESSURE: 136 MMHG | WEIGHT: 155.06 LBS | OXYGEN SATURATION: 97 %

## 2024-12-05 DIAGNOSIS — E11.9 TYPE 2 DIABETES MELLITUS W/OUT COMPLICATIONS: ICD-10-CM

## 2024-12-05 LAB — GLUCOSE BLDC GLUCOMTR-MCNC: 135

## 2024-12-05 PROCEDURE — 82962 GLUCOSE BLOOD TEST: CPT

## 2024-12-05 PROCEDURE — 99213 OFFICE O/P EST LOW 20 MIN: CPT

## 2024-12-23 ENCOUNTER — APPOINTMENT (OUTPATIENT)
Dept: CARDIOLOGY | Facility: CLINIC | Age: 74
End: 2024-12-23
Payer: MEDICARE

## 2024-12-23 ENCOUNTER — NON-APPOINTMENT (OUTPATIENT)
Age: 74
End: 2024-12-23

## 2024-12-23 VITALS
HEART RATE: 59 BPM | DIASTOLIC BLOOD PRESSURE: 64 MMHG | OXYGEN SATURATION: 99 % | BODY MASS INDEX: 23.72 KG/M2 | WEIGHT: 156 LBS | SYSTOLIC BLOOD PRESSURE: 124 MMHG

## 2024-12-23 DIAGNOSIS — I10 ESSENTIAL (PRIMARY) HYPERTENSION: ICD-10-CM

## 2024-12-23 DIAGNOSIS — E78.5 HYPERLIPIDEMIA, UNSPECIFIED: ICD-10-CM

## 2024-12-23 DIAGNOSIS — I25.10 ATHEROSCLEROTIC HEART DISEASE OF NATIVE CORONARY ARTERY W/OUT ANGINA PECTORIS: ICD-10-CM

## 2024-12-23 DIAGNOSIS — Z95.1 PRESENCE OF AORTOCORONARY BYPASS GRAFT: ICD-10-CM

## 2024-12-23 DIAGNOSIS — Z95.5 PRESENCE OF CORONARY ANGIOPLASTY IMPLANT AND GRAFT: ICD-10-CM

## 2024-12-23 PROCEDURE — 93000 ELECTROCARDIOGRAM COMPLETE: CPT

## 2024-12-23 PROCEDURE — 99214 OFFICE O/P EST MOD 30 MIN: CPT

## 2024-12-23 PROCEDURE — G2211 COMPLEX E/M VISIT ADD ON: CPT

## 2025-03-14 ENCOUNTER — APPOINTMENT (OUTPATIENT)
Dept: INTERNAL MEDICINE | Facility: CLINIC | Age: 75
End: 2025-03-14
Payer: MEDICARE

## 2025-03-14 VITALS
WEIGHT: 161 LBS | DIASTOLIC BLOOD PRESSURE: 60 MMHG | OXYGEN SATURATION: 98 % | BODY MASS INDEX: 25.27 KG/M2 | SYSTOLIC BLOOD PRESSURE: 120 MMHG | HEART RATE: 65 BPM | HEIGHT: 67 IN | TEMPERATURE: 97.7 F

## 2025-03-14 DIAGNOSIS — E78.5 HYPERLIPIDEMIA, UNSPECIFIED: ICD-10-CM

## 2025-03-14 DIAGNOSIS — Z23 ENCOUNTER FOR IMMUNIZATION: ICD-10-CM

## 2025-03-14 DIAGNOSIS — I10 ESSENTIAL (PRIMARY) HYPERTENSION: ICD-10-CM

## 2025-03-14 DIAGNOSIS — E11.9 TYPE 2 DIABETES MELLITUS W/OUT COMPLICATIONS: ICD-10-CM

## 2025-03-14 PROCEDURE — 99214 OFFICE O/P EST MOD 30 MIN: CPT | Mod: 25

## 2025-03-14 PROCEDURE — G2211 COMPLEX E/M VISIT ADD ON: CPT

## 2025-03-14 PROCEDURE — 90471 IMMUNIZATION ADMIN: CPT

## 2025-03-14 PROCEDURE — 90734 MENACWYD/MENACWYCRM VACC IM: CPT

## 2025-03-15 LAB
ALBUMIN SERPL ELPH-MCNC: 4.3 G/DL
ALP BLD-CCNC: 55 U/L
ALT SERPL-CCNC: 35 U/L
ANION GAP SERPL CALC-SCNC: 12 MMOL/L
AST SERPL-CCNC: 27 U/L
BILIRUB SERPL-MCNC: 0.4 MG/DL
BUN SERPL-MCNC: 21 MG/DL
CALCIUM SERPL-MCNC: 9.4 MG/DL
CHLORIDE SERPL-SCNC: 102 MMOL/L
CHOLEST SERPL-MCNC: 104 MG/DL
CO2 SERPL-SCNC: 23 MMOL/L
CREAT SERPL-MCNC: 1.02 MG/DL
CREAT SPEC-SCNC: 147 MG/DL
EGFRCR SERPLBLD CKD-EPI 2021: 77 ML/MIN/1.73M2
ESTIMATED AVERAGE GLUCOSE: 166 MG/DL
GLUCOSE SERPL-MCNC: 214 MG/DL
HBA1C MFR BLD HPLC: 7.4 %
HDLC SERPL-MCNC: 37 MG/DL
LDLC SERPL CALC-MCNC: 52 MG/DL
MICROALBUMIN 24H UR DL<=1MG/L-MCNC: <1.2 MG/DL
MICROALBUMIN/CREAT 24H UR-RTO: NORMAL MG/G
NONHDLC SERPL-MCNC: 67 MG/DL
POTASSIUM SERPL-SCNC: 4.5 MMOL/L
PROT SERPL-MCNC: 7.3 G/DL
SODIUM SERPL-SCNC: 138 MMOL/L
TRIGL SERPL-MCNC: 70 MG/DL

## 2025-04-30 NOTE — ASSESSMENT
Will discuss at her visit next week   [FreeTextEntry1] : CAD s/p CABG 2004\par \par HTN\par \par HLD

## 2025-05-29 ENCOUNTER — APPOINTMENT (OUTPATIENT)
Dept: CARDIOLOGY | Facility: CLINIC | Age: 75
End: 2025-05-29
Payer: MEDICARE

## 2025-05-29 PROCEDURE — A9500: CPT

## 2025-05-29 PROCEDURE — 78452 HT MUSCLE IMAGE SPECT MULT: CPT

## 2025-05-29 PROCEDURE — 93015 CV STRESS TEST SUPVJ I&R: CPT

## 2025-05-30 ENCOUNTER — NON-APPOINTMENT (OUTPATIENT)
Age: 75
End: 2025-05-30

## 2025-06-02 ENCOUNTER — RX RENEWAL (OUTPATIENT)
Age: 75
End: 2025-06-02

## 2025-06-12 ENCOUNTER — RX RENEWAL (OUTPATIENT)
Age: 75
End: 2025-06-12

## 2025-06-13 ENCOUNTER — RX RENEWAL (OUTPATIENT)
Age: 75
End: 2025-06-13

## 2025-06-20 ENCOUNTER — APPOINTMENT (OUTPATIENT)
Dept: ENDOCRINOLOGY | Facility: CLINIC | Age: 75
End: 2025-06-20

## 2025-06-23 ENCOUNTER — TRANSCRIPTION ENCOUNTER (OUTPATIENT)
Age: 75
End: 2025-06-23

## 2025-06-24 ENCOUNTER — TRANSCRIPTION ENCOUNTER (OUTPATIENT)
Age: 75
End: 2025-06-24

## 2025-06-25 ENCOUNTER — TRANSCRIPTION ENCOUNTER (OUTPATIENT)
Age: 75
End: 2025-06-25

## 2025-07-08 ENCOUNTER — NON-APPOINTMENT (OUTPATIENT)
Age: 75
End: 2025-07-08

## 2025-07-10 ENCOUNTER — APPOINTMENT (OUTPATIENT)
Dept: OTOLARYNGOLOGY | Facility: CLINIC | Age: 75
End: 2025-07-10
Payer: MEDICARE

## 2025-07-10 ENCOUNTER — NON-APPOINTMENT (OUTPATIENT)
Age: 75
End: 2025-07-10

## 2025-07-10 VITALS
TEMPERATURE: 98.1 F | SYSTOLIC BLOOD PRESSURE: 138 MMHG | WEIGHT: 161 LBS | HEIGHT: 67 IN | HEART RATE: 58 BPM | BODY MASS INDEX: 25.27 KG/M2 | DIASTOLIC BLOOD PRESSURE: 69 MMHG

## 2025-07-10 PROBLEM — H90.3 SENSORY HEARING LOSS, BILATERAL: Status: ACTIVE | Noted: 2025-07-10

## 2025-07-10 PROBLEM — Z01.10 AUDITORY ACUITY EVALUATION: Status: ACTIVE | Noted: 2025-07-10

## 2025-07-10 PROBLEM — H91.90 HEARING LOSS: Status: ACTIVE | Noted: 2025-06-23

## 2025-07-10 PROCEDURE — 99203 OFFICE O/P NEW LOW 30 MIN: CPT | Mod: 25

## 2025-07-10 PROCEDURE — 92567 TYMPANOMETRY: CPT

## 2025-07-10 PROCEDURE — 92557 COMPREHENSIVE HEARING TEST: CPT

## 2025-07-22 ENCOUNTER — TRANSCRIPTION ENCOUNTER (OUTPATIENT)
Age: 75
End: 2025-07-22

## 2025-07-30 ENCOUNTER — INPATIENT (INPATIENT)
Facility: HOSPITAL | Age: 75
LOS: 0 days | Discharge: HOME | DRG: 313 | End: 2025-07-31
Attending: HOSPITALIST | Admitting: STUDENT IN AN ORGANIZED HEALTH CARE EDUCATION/TRAINING PROGRAM
Payer: COMMERCIAL

## 2025-07-30 VITALS
TEMPERATURE: 98 F | HEART RATE: 58 BPM | HEIGHT: 67 IN | WEIGHT: 164.91 LBS | RESPIRATION RATE: 18 BRPM | OXYGEN SATURATION: 97 % | DIASTOLIC BLOOD PRESSURE: 70 MMHG | SYSTOLIC BLOOD PRESSURE: 172 MMHG

## 2025-07-30 DIAGNOSIS — Z95.1 PRESENCE OF AORTOCORONARY BYPASS GRAFT: Chronic | ICD-10-CM

## 2025-07-30 LAB
ALBUMIN SERPL ELPH-MCNC: 4.3 G/DL — SIGNIFICANT CHANGE UP (ref 3.3–5)
ALP SERPL-CCNC: 46 U/L — SIGNIFICANT CHANGE UP (ref 40–120)
ALT FLD-CCNC: 24 U/L — SIGNIFICANT CHANGE UP (ref 10–45)
ANION GAP SERPL CALC-SCNC: 12 MMOL/L — SIGNIFICANT CHANGE UP (ref 5–17)
AST SERPL-CCNC: 26 U/L — SIGNIFICANT CHANGE UP (ref 10–40)
BASOPHILS # BLD AUTO: 0.06 K/UL — SIGNIFICANT CHANGE UP (ref 0–0.2)
BASOPHILS NFR BLD AUTO: 0.6 % — SIGNIFICANT CHANGE UP (ref 0–2)
BILIRUB SERPL-MCNC: 0.4 MG/DL — SIGNIFICANT CHANGE UP (ref 0.2–1.2)
BUN SERPL-MCNC: 25 MG/DL — HIGH (ref 7–23)
CALCIUM SERPL-MCNC: 9.5 MG/DL — SIGNIFICANT CHANGE UP (ref 8.4–10.5)
CHLORIDE SERPL-SCNC: 104 MMOL/L — SIGNIFICANT CHANGE UP (ref 96–108)
CO2 SERPL-SCNC: 21 MMOL/L — LOW (ref 22–31)
CREAT SERPL-MCNC: 0.83 MG/DL — SIGNIFICANT CHANGE UP (ref 0.5–1.3)
EGFR: 91 ML/MIN/1.73M2 — SIGNIFICANT CHANGE UP
EGFR: 91 ML/MIN/1.73M2 — SIGNIFICANT CHANGE UP
EOSINOPHIL # BLD AUTO: 1.44 K/UL — HIGH (ref 0–0.5)
EOSINOPHIL NFR BLD AUTO: 14.2 % — HIGH (ref 0–6)
GLUCOSE SERPL-MCNC: 154 MG/DL — HIGH (ref 70–99)
HCT VFR BLD CALC: 38.2 % — LOW (ref 39–50)
HGB BLD-MCNC: 12.3 G/DL — LOW (ref 13–17)
IMM GRANULOCYTES # BLD AUTO: 0.02 K/UL — SIGNIFICANT CHANGE UP (ref 0–0.07)
IMM GRANULOCYTES NFR BLD AUTO: 0.2 % — SIGNIFICANT CHANGE UP (ref 0–0.9)
LYMPHOCYTES # BLD AUTO: 2.21 K/UL — SIGNIFICANT CHANGE UP (ref 1–3.3)
LYMPHOCYTES NFR BLD AUTO: 21.8 % — SIGNIFICANT CHANGE UP (ref 13–44)
MCHC RBC-ENTMCNC: 26.1 PG — LOW (ref 27–34)
MCHC RBC-ENTMCNC: 32.2 G/DL — SIGNIFICANT CHANGE UP (ref 32–36)
MCV RBC AUTO: 81.1 FL — SIGNIFICANT CHANGE UP (ref 80–100)
MONOCYTES # BLD AUTO: 0.95 K/UL — HIGH (ref 0–0.9)
MONOCYTES NFR BLD AUTO: 9.4 % — SIGNIFICANT CHANGE UP (ref 2–14)
NEUTROPHILS # BLD AUTO: 5.47 K/UL — SIGNIFICANT CHANGE UP (ref 1.8–7.4)
NEUTROPHILS NFR BLD AUTO: 53.8 % — SIGNIFICANT CHANGE UP (ref 43–77)
NRBC # BLD AUTO: 0 K/UL — SIGNIFICANT CHANGE UP (ref 0–0)
NRBC # FLD: 0 K/UL — SIGNIFICANT CHANGE UP (ref 0–0)
NRBC BLD AUTO-RTO: 0 /100 WBCS — SIGNIFICANT CHANGE UP (ref 0–0)
PLATELET # BLD AUTO: 222 K/UL — SIGNIFICANT CHANGE UP (ref 150–400)
PMV BLD: 10 FL — SIGNIFICANT CHANGE UP (ref 7–13)
POTASSIUM SERPL-MCNC: 4.5 MMOL/L — SIGNIFICANT CHANGE UP (ref 3.5–5.3)
POTASSIUM SERPL-SCNC: 4.5 MMOL/L — SIGNIFICANT CHANGE UP (ref 3.5–5.3)
PROT SERPL-MCNC: 7.4 G/DL — SIGNIFICANT CHANGE UP (ref 6–8.3)
RBC # BLD: 4.71 M/UL — SIGNIFICANT CHANGE UP (ref 4.2–5.8)
RBC # FLD: 13.4 % — SIGNIFICANT CHANGE UP (ref 10.3–14.5)
SODIUM SERPL-SCNC: 137 MMOL/L — SIGNIFICANT CHANGE UP (ref 135–145)
TROPONIN T, HIGH SENSITIVITY RESULT: 12 NG/L — SIGNIFICANT CHANGE UP (ref 0–51)
WBC # BLD: 10.15 K/UL — SIGNIFICANT CHANGE UP (ref 3.8–10.5)
WBC # FLD AUTO: 10.15 K/UL — SIGNIFICANT CHANGE UP (ref 3.8–10.5)

## 2025-07-30 PROCEDURE — 99285 EMERGENCY DEPT VISIT HI MDM: CPT

## 2025-07-30 PROCEDURE — 93010 ELECTROCARDIOGRAM REPORT: CPT

## 2025-07-30 PROCEDURE — 71046 X-RAY EXAM CHEST 2 VIEWS: CPT | Mod: 26

## 2025-07-30 RX ORDER — ASPIRIN 325 MG
324 TABLET ORAL ONCE
Refills: 0 | Status: COMPLETED | OUTPATIENT
Start: 2025-07-30 | End: 2025-07-30

## 2025-07-30 RX ADMIN — Medication 324 MILLIGRAM(S): at 22:06

## 2025-07-31 ENCOUNTER — RESULT REVIEW (OUTPATIENT)
Age: 75
End: 2025-07-31

## 2025-07-31 ENCOUNTER — TRANSCRIPTION ENCOUNTER (OUTPATIENT)
Age: 75
End: 2025-07-31

## 2025-07-31 VITALS — DIASTOLIC BLOOD PRESSURE: 75 MMHG | SYSTOLIC BLOOD PRESSURE: 149 MMHG | HEART RATE: 55 BPM

## 2025-07-31 DIAGNOSIS — E78.5 HYPERLIPIDEMIA, UNSPECIFIED: ICD-10-CM

## 2025-07-31 DIAGNOSIS — Z95.1 PRESENCE OF AORTOCORONARY BYPASS GRAFT: ICD-10-CM

## 2025-07-31 DIAGNOSIS — R07.9 CHEST PAIN, UNSPECIFIED: ICD-10-CM

## 2025-07-31 DIAGNOSIS — I10 ESSENTIAL (PRIMARY) HYPERTENSION: ICD-10-CM

## 2025-07-31 DIAGNOSIS — R09.89 OTHER SPECIFIED SYMPTOMS AND SIGNS INVOLVING THE CIRCULATORY AND RESPIRATORY SYSTEMS: ICD-10-CM

## 2025-07-31 DIAGNOSIS — E11.9 TYPE 2 DIABETES MELLITUS WITHOUT COMPLICATIONS: ICD-10-CM

## 2025-07-31 DIAGNOSIS — Z29.9 ENCOUNTER FOR PROPHYLACTIC MEASURES, UNSPECIFIED: ICD-10-CM

## 2025-07-31 LAB
A1C WITH ESTIMATED AVERAGE GLUCOSE RESULT: 7.5 % — HIGH (ref 4–5.6)
ADD ON TEST-SPECIMEN IN LAB: SIGNIFICANT CHANGE UP
CHOLEST SERPL-MCNC: 105 MG/DL — SIGNIFICANT CHANGE UP
ESTIMATED AVERAGE GLUCOSE: 169 MG/DL — HIGH (ref 68–114)
GLUCOSE BLDC GLUCOMTR-MCNC: 112 MG/DL — HIGH (ref 70–99)
GLUCOSE BLDC GLUCOMTR-MCNC: 130 MG/DL — HIGH (ref 70–99)
GLUCOSE BLDC GLUCOMTR-MCNC: 131 MG/DL — HIGH (ref 70–99)
HDLC SERPL-MCNC: 35 MG/DL — LOW
LDLC SERPL-MCNC: 55 MG/DL — SIGNIFICANT CHANGE UP
LIPID PNL WITH DIRECT LDL SERPL: 55 MG/DL — SIGNIFICANT CHANGE UP
NONHDLC SERPL-MCNC: 71 MG/DL — SIGNIFICANT CHANGE UP
TRIGL SERPL-MCNC: 74 MG/DL — SIGNIFICANT CHANGE UP
TROPONIN T, HIGH SENSITIVITY RESULT: 10 NG/L — SIGNIFICANT CHANGE UP (ref 0–51)
TROPONIN T, HIGH SENSITIVITY RESULT: 14 NG/L — SIGNIFICANT CHANGE UP (ref 0–51)

## 2025-07-31 PROCEDURE — 93306 TTE W/DOPPLER COMPLETE: CPT

## 2025-07-31 PROCEDURE — 36415 COLL VENOUS BLD VENIPUNCTURE: CPT

## 2025-07-31 PROCEDURE — 93306 TTE W/DOPPLER COMPLETE: CPT | Mod: 26

## 2025-07-31 PROCEDURE — 84484 ASSAY OF TROPONIN QUANT: CPT

## 2025-07-31 PROCEDURE — 80061 LIPID PANEL: CPT

## 2025-07-31 PROCEDURE — 80053 COMPREHEN METABOLIC PANEL: CPT

## 2025-07-31 PROCEDURE — 85025 COMPLETE CBC W/AUTO DIFF WBC: CPT

## 2025-07-31 PROCEDURE — 99223 1ST HOSP IP/OBS HIGH 75: CPT

## 2025-07-31 PROCEDURE — 93010 ELECTROCARDIOGRAM REPORT: CPT

## 2025-07-31 PROCEDURE — 99285 EMERGENCY DEPT VISIT HI MDM: CPT

## 2025-07-31 PROCEDURE — 83036 HEMOGLOBIN GLYCOSYLATED A1C: CPT

## 2025-07-31 PROCEDURE — 71046 X-RAY EXAM CHEST 2 VIEWS: CPT

## 2025-07-31 PROCEDURE — 82962 GLUCOSE BLOOD TEST: CPT

## 2025-07-31 RX ORDER — GLUCAGON 3 MG/1
1 POWDER NASAL ONCE
Refills: 0 | Status: DISCONTINUED | OUTPATIENT
Start: 2025-07-31 | End: 2025-07-31

## 2025-07-31 RX ORDER — ATORVASTATIN CALCIUM 80 MG/1
80 TABLET, FILM COATED ORAL AT BEDTIME
Refills: 0 | Status: DISCONTINUED | OUTPATIENT
Start: 2025-07-31 | End: 2025-07-31

## 2025-07-31 RX ORDER — INSULIN LISPRO 100 U/ML
INJECTION, SOLUTION INTRAVENOUS; SUBCUTANEOUS AT BEDTIME
Refills: 0 | Status: DISCONTINUED | OUTPATIENT
Start: 2025-07-31 | End: 2025-07-31

## 2025-07-31 RX ORDER — ISOSORBIDE MONONITRATE 60 MG/1
30 TABLET, EXTENDED RELEASE ORAL DAILY
Refills: 0 | Status: DISCONTINUED | OUTPATIENT
Start: 2025-07-31 | End: 2025-07-31

## 2025-07-31 RX ORDER — METOPROLOL SUCCINATE 50 MG/1
3 TABLET, EXTENDED RELEASE ORAL
Refills: 0 | DISCHARGE

## 2025-07-31 RX ORDER — MELATONIN 5 MG
3 TABLET ORAL AT BEDTIME
Refills: 0 | Status: DISCONTINUED | OUTPATIENT
Start: 2025-07-31 | End: 2025-07-31

## 2025-07-31 RX ORDER — LISINOPRIL 5 MG/1
1 TABLET ORAL
Qty: 30 | Refills: 0
Start: 2025-07-31 | End: 2025-08-29

## 2025-07-31 RX ORDER — LISINOPRIL 5 MG/1
5 TABLET ORAL DAILY
Refills: 0 | Status: DISCONTINUED | OUTPATIENT
Start: 2025-07-31 | End: 2025-07-31

## 2025-07-31 RX ORDER — INSULIN LISPRO 100 U/ML
INJECTION, SOLUTION INTRAVENOUS; SUBCUTANEOUS
Refills: 0 | Status: DISCONTINUED | OUTPATIENT
Start: 2025-07-31 | End: 2025-07-31

## 2025-07-31 RX ORDER — SODIUM CHLORIDE 9 G/1000ML
1000 INJECTION, SOLUTION INTRAVENOUS
Refills: 0 | Status: DISCONTINUED | OUTPATIENT
Start: 2025-07-31 | End: 2025-07-31

## 2025-07-31 RX ORDER — DEXTROSE 50 % IN WATER 50 %
15 SYRINGE (ML) INTRAVENOUS ONCE
Refills: 0 | Status: DISCONTINUED | OUTPATIENT
Start: 2025-07-31 | End: 2025-07-31

## 2025-07-31 RX ORDER — CLOPIDOGREL BISULFATE 75 MG/1
1 TABLET, FILM COATED ORAL
Refills: 0 | DISCHARGE

## 2025-07-31 RX ORDER — METOPROLOL SUCCINATE 50 MG/1
50 TABLET, EXTENDED RELEASE ORAL AT BEDTIME
Refills: 0 | Status: DISCONTINUED | OUTPATIENT
Start: 2025-07-31 | End: 2025-07-31

## 2025-07-31 RX ORDER — ASPIRIN 325 MG
1 TABLET ORAL
Refills: 0 | DISCHARGE

## 2025-07-31 RX ORDER — LISINOPRIL 5 MG/1
5 TABLET ORAL AT BEDTIME
Refills: 0 | Status: DISCONTINUED | OUTPATIENT
Start: 2025-07-31 | End: 2025-07-31

## 2025-07-31 RX ORDER — METOPROLOL SUCCINATE 50 MG/1
1 TABLET, EXTENDED RELEASE ORAL
Qty: 30 | Refills: 0
Start: 2025-07-31 | End: 2025-08-29

## 2025-07-31 RX ORDER — ATORVASTATIN CALCIUM 80 MG/1
1 TABLET, FILM COATED ORAL
Refills: 0 | DISCHARGE

## 2025-07-31 RX ORDER — LISINOPRIL 5 MG/1
10 TABLET ORAL AT BEDTIME
Refills: 0 | Status: DISCONTINUED | OUTPATIENT
Start: 2025-07-31 | End: 2025-07-31

## 2025-07-31 RX ORDER — LISINOPRIL 5 MG/1
1 TABLET ORAL
Refills: 0 | DISCHARGE

## 2025-07-31 RX ORDER — DEXTROSE 50 % IN WATER 50 %
25 SYRINGE (ML) INTRAVENOUS ONCE
Refills: 0 | Status: DISCONTINUED | OUTPATIENT
Start: 2025-07-31 | End: 2025-07-31

## 2025-07-31 RX ORDER — METFORMIN HYDROCHLORIDE 850 MG/1
1 TABLET ORAL
Refills: 0 | DISCHARGE

## 2025-07-31 RX ORDER — ACETAMINOPHEN 500 MG/5ML
650 LIQUID (ML) ORAL EVERY 6 HOURS
Refills: 0 | Status: DISCONTINUED | OUTPATIENT
Start: 2025-07-31 | End: 2025-07-31

## 2025-07-31 RX ORDER — DEXTROSE 50 % IN WATER 50 %
12.5 SYRINGE (ML) INTRAVENOUS ONCE
Refills: 0 | Status: DISCONTINUED | OUTPATIENT
Start: 2025-07-31 | End: 2025-07-31

## 2025-07-31 RX ORDER — ISOSORBIDE MONONITRATE 60 MG/1
1 TABLET, EXTENDED RELEASE ORAL
Qty: 30 | Refills: 0
Start: 2025-07-31 | End: 2025-08-29

## 2025-07-31 RX ORDER — METOPROLOL SUCCINATE 50 MG/1
75 TABLET, EXTENDED RELEASE ORAL AT BEDTIME
Refills: 0 | Status: DISCONTINUED | OUTPATIENT
Start: 2025-07-31 | End: 2025-07-31

## 2025-07-31 RX ORDER — CLOPIDOGREL BISULFATE 75 MG/1
75 TABLET, FILM COATED ORAL DAILY
Refills: 0 | Status: DISCONTINUED | OUTPATIENT
Start: 2025-07-31 | End: 2025-07-31

## 2025-07-31 RX ORDER — ISOSORBIDE MONONITRATE 60 MG/1
15 TABLET, EXTENDED RELEASE ORAL
Refills: 0 | DISCHARGE

## 2025-07-31 RX ORDER — ASPIRIN 325 MG
81 TABLET ORAL DAILY
Refills: 0 | Status: DISCONTINUED | OUTPATIENT
Start: 2025-07-31 | End: 2025-07-31

## 2025-07-31 RX ADMIN — Medication 81 MILLIGRAM(S): at 17:49

## 2025-07-31 RX ADMIN — CLOPIDOGREL BISULFATE 75 MILLIGRAM(S): 75 TABLET, FILM COATED ORAL at 17:51

## 2025-08-01 PROBLEM — E11.9 TYPE 2 DIABETES MELLITUS WITHOUT COMPLICATIONS: Chronic | Status: ACTIVE | Noted: 2025-07-31

## 2025-08-05 ENCOUNTER — APPOINTMENT (OUTPATIENT)
Dept: INTERNAL MEDICINE | Facility: CLINIC | Age: 75
End: 2025-08-05
Payer: MEDICARE

## 2025-08-05 VITALS
HEART RATE: 68 BPM | TEMPERATURE: 99 F | WEIGHT: 159 LBS | BODY MASS INDEX: 24.96 KG/M2 | HEIGHT: 67 IN | SYSTOLIC BLOOD PRESSURE: 122 MMHG | OXYGEN SATURATION: 98 % | DIASTOLIC BLOOD PRESSURE: 70 MMHG

## 2025-08-05 VITALS — SYSTOLIC BLOOD PRESSURE: 115 MMHG | DIASTOLIC BLOOD PRESSURE: 62 MMHG

## 2025-08-05 DIAGNOSIS — R07.89 OTHER CHEST PAIN: ICD-10-CM

## 2025-08-05 PROCEDURE — 99495 TRANSJ CARE MGMT MOD F2F 14D: CPT

## 2025-08-06 LAB
BASOPHILS # BLD AUTO: 0.08 K/UL
BASOPHILS NFR BLD AUTO: 0.8 %
EOSINOPHIL # BLD AUTO: 1.63 K/UL
EOSINOPHIL NFR BLD AUTO: 16 %
FERRITIN SERPL-MCNC: 46 NG/ML
FOLATE SERPL-MCNC: 17.3 NG/ML
HCT VFR BLD CALC: 37.1 %
HGB A MFR BLD: 97.2 %
HGB A2 MFR BLD: 2.8 %
HGB BLD-MCNC: 11.8 G/DL
HGB FRACT BLD-IMP: NORMAL
IMM GRANULOCYTES NFR BLD AUTO: 0.2 %
IRON SATN MFR SERPL: 14 %
IRON SERPL-MCNC: 48 UG/DL
LYMPHOCYTES # BLD AUTO: 2.62 K/UL
LYMPHOCYTES NFR BLD AUTO: 25.7 %
MAN DIFF?: NORMAL
MCHC RBC-ENTMCNC: 25.8 PG
MCHC RBC-ENTMCNC: 31.8 G/DL
MCV RBC AUTO: 81 FL
MONOCYTES # BLD AUTO: 0.93 K/UL
MONOCYTES NFR BLD AUTO: 9.1 %
NEUTROPHILS # BLD AUTO: 4.9 K/UL
NEUTROPHILS NFR BLD AUTO: 48.2 %
PLATELET # BLD AUTO: 255 K/UL
RBC # BLD: 4.58 M/UL
RBC # FLD: 14.1 %
TIBC SERPL-MCNC: 343 UG/DL
UIBC SERPL-MCNC: 295 UG/DL
VIT B12 SERPL-MCNC: 776 PG/ML
WBC # FLD AUTO: 10.18 K/UL

## 2025-08-07 LAB
ALBUMIN MFR SERPL ELPH: 58.8 %
ALBUMIN SERPL-MCNC: 4.4 G/DL
ALBUMIN/GLOB SERPL: 1.5 RATIO
ALPHA1 GLOB MFR SERPL ELPH: 3 %
ALPHA1 GLOB SERPL ELPH-MCNC: 0.2 G/DL
ALPHA2 GLOB MFR SERPL ELPH: 8.9 %
ALPHA2 GLOB SERPL ELPH-MCNC: 0.7 G/DL
B-GLOBULIN MFR SERPL ELPH: 10.4 %
B-GLOBULIN SERPL ELPH-MCNC: 0.8 G/DL
DEPRECATED KAPPA LC FREE/LAMBDA SER: 1.75 RATIO
GAMMA GLOB FLD ELPH-MCNC: 1.4 G/DL
GAMMA GLOB MFR SERPL ELPH: 18.9 %
IGA SERPL-MCNC: 215 MG/DL
IGG SERPL-MCNC: 1435 MG/DL
IGM SERPL-MCNC: 44 MG/DL
INTERPRETATION SERPL IEP-IMP: NORMAL
KAPPA LC CSF-MCNC: 2.27 MG/DL
KAPPA LC SERPL-MCNC: 3.98 MG/DL
M PROTEIN SPEC IFE-MCNC: NORMAL
PROT SERPL-MCNC: 7.4 G/DL
PROT SERPL-MCNC: 7.4 G/DL

## 2025-08-21 RX ORDER — BLOOD SUGAR DIAGNOSTIC
STRIP MISCELLANEOUS DAILY
Qty: 1 | Refills: 1 | Status: ACTIVE | COMMUNITY
Start: 2025-08-19 | End: 1900-01-01

## 2025-08-21 RX ORDER — BLOOD-GLUCOSE METER
W/DEVICE EACH MISCELLANEOUS
Qty: 1 | Refills: 0 | Status: ACTIVE | COMMUNITY
Start: 2025-08-19 | End: 1900-01-01

## 2025-08-21 RX ORDER — LANCETS
EACH MISCELLANEOUS
Qty: 1 | Refills: 1 | Status: ACTIVE | COMMUNITY
Start: 2025-08-19 | End: 1900-01-01

## 2025-08-22 ENCOUNTER — NON-APPOINTMENT (OUTPATIENT)
Age: 75
End: 2025-08-22

## 2025-08-22 ENCOUNTER — APPOINTMENT (OUTPATIENT)
Dept: CARDIOLOGY | Facility: CLINIC | Age: 75
End: 2025-08-22
Payer: MEDICARE

## 2025-08-22 VITALS
WEIGHT: 159 LBS | DIASTOLIC BLOOD PRESSURE: 66 MMHG | SYSTOLIC BLOOD PRESSURE: 110 MMHG | BODY MASS INDEX: 24.9 KG/M2 | HEART RATE: 64 BPM

## 2025-08-22 DIAGNOSIS — Z95.5 PRESENCE OF CORONARY ANGIOPLASTY IMPLANT AND GRAFT: ICD-10-CM

## 2025-08-22 DIAGNOSIS — I10 ESSENTIAL (PRIMARY) HYPERTENSION: ICD-10-CM

## 2025-08-22 DIAGNOSIS — E78.5 HYPERLIPIDEMIA, UNSPECIFIED: ICD-10-CM

## 2025-08-22 DIAGNOSIS — Z95.1 PRESENCE OF AORTOCORONARY BYPASS GRAFT: ICD-10-CM

## 2025-08-22 DIAGNOSIS — I25.10 ATHEROSCLEROTIC HEART DISEASE OF NATIVE CORONARY ARTERY W/OUT ANGINA PECTORIS: ICD-10-CM

## 2025-08-22 PROCEDURE — 93000 ELECTROCARDIOGRAM COMPLETE: CPT

## 2025-08-22 PROCEDURE — 99214 OFFICE O/P EST MOD 30 MIN: CPT

## 2025-08-22 PROCEDURE — G2211 COMPLEX E/M VISIT ADD ON: CPT

## 2025-08-26 ENCOUNTER — TRANSCRIPTION ENCOUNTER (OUTPATIENT)
Age: 75
End: 2025-08-26

## 2025-08-28 ENCOUNTER — TRANSCRIPTION ENCOUNTER (OUTPATIENT)
Age: 75
End: 2025-08-28